# Patient Record
Sex: FEMALE | Race: BLACK OR AFRICAN AMERICAN | Employment: FULL TIME | URBAN - METROPOLITAN AREA
[De-identification: names, ages, dates, MRNs, and addresses within clinical notes are randomized per-mention and may not be internally consistent; named-entity substitution may affect disease eponyms.]

---

## 2017-01-13 ENCOUNTER — OFFICE VISIT (OUTPATIENT)
Dept: INTERNAL MEDICINE CLINIC | Age: 47
End: 2017-01-13

## 2017-01-13 VITALS
SYSTOLIC BLOOD PRESSURE: 133 MMHG | HEART RATE: 102 BPM | OXYGEN SATURATION: 98 % | RESPIRATION RATE: 16 BRPM | DIASTOLIC BLOOD PRESSURE: 85 MMHG | WEIGHT: 268 LBS | BODY MASS INDEX: 43.07 KG/M2 | TEMPERATURE: 98 F | HEIGHT: 66 IN

## 2017-01-13 DIAGNOSIS — B00.1 COLD SORE: ICD-10-CM

## 2017-01-13 RX ORDER — VALACYCLOVIR HYDROCHLORIDE 1 G/1
TABLET, FILM COATED ORAL
Qty: 4 TAB | Refills: 11 | Status: SHIPPED | OUTPATIENT
Start: 2017-01-13 | End: 2017-01-13 | Stop reason: SDUPTHER

## 2017-01-13 RX ORDER — VALACYCLOVIR HYDROCHLORIDE 1 G/1
TABLET, FILM COATED ORAL
Qty: 4 TAB | Refills: 11 | Status: SHIPPED | OUTPATIENT
Start: 2017-01-13

## 2017-01-13 NOTE — MR AVS SNAPSHOT
Visit Information Date & Time Provider Department Dept. Phone Encounter #  
 1/13/2017  1:30 PM Miley Falcon, [address]  [id number] Upcoming Health Maintenance Date Due INFLUENZA AGE 9 TO ADULT 8/1/2016 PAP AKA CERVICAL CYTOLOGY 6/30/2017 DTaP/Tdap/Td series (2 - Td) 6/30/2024 Allergies as of 1/13/2017  Review Complete On: 1/13/2017 By: Jose Cruz No Known Allergies Current Immunizations  Reviewed on 2/5/2015 Name Date Influenza High Dose Vaccine PF 11/1/2014 PPD 1/18/2010 Tdap 6/30/2014 Not reviewed this visit You Were Diagnosed With   
  
 Codes Comments Cold sore     ICD-10-CM: B00.1 ICD-9-CM: 054.9 Vitals BP Pulse Temp Resp Height(growth percentile) Weight(growth percentile) 133/85 (BP 1 Location: Left arm, BP Patient Position: Sitting) (!) 102 98 °F (36.7 °C) (Oral) 16 5' 6\" (1.676 m) 268 lb (121.6 kg) LMP SpO2 BMI OB Status Smoking Status 12/26/2016 (Exact Date) 98% 43.26 kg/m2 Having regular periods Never Smoker Vitals History BMI and BSA Data Body Mass Index Body Surface Area  
 43.26 kg/m 2 2.38 m 2 Preferred Pharmacy Pharmacy Name Phone [facility] [address] [facility] [address] Your Updated Medication List  
  
   
This list is accurate as of: 1/13/17  2:38 PM.  Always use your most recent med list.  
  
  
  
  
 aspirin 81 mg tablet Take 81 mg by mouth.  
  
 biotin 2,500 mcg Tab Take  by mouth daily. citalopram 10 mg tablet Commonly known as:  Dawna Narrow Take 1 tablet by mouth daily. FISH OIL 1,000 mg Cap Generic drug:  omega-3 fatty acids-vitamin e Take 1 Cap by mouth daily. naproxen 500 mg tablet Commonly known as:  NAPROSYN Take 1 Tab by mouth two (2) times daily (with meals). ONE DAILY GUMMY VITES PO Take  by mouth. traMADol 50 mg tablet Commonly known as:  ULTRAM  
Take 1 tablet by mouth every six (6) hours as needed for Pain. TYLENOL PM PO Take  by mouth. valACYclovir 1 gram tablet Commonly known as:  VALTREX Take 2 pills q12h x 2 doses Prescriptions Sent to Pharmacy Refills  
 valACYclovir (VALTREX) 1 gram tablet 11 Sig: Take 2 pills q12h x 2 doses Class: Normal  
 Pharmacy: [id number], [facility] [address] Ph #: [phone] Introducing [facility]! Sanjuanaalba Lopez introduces [employer] patient portal. Now you can access parts of your medical record, email your doctor's office, and request medication refills online. 1. In your internet browser, go to [email or web address] 2. Click on the First Time User? Click Here link in the Sign In box. You will see the New Member Sign Up page. 3. Enter your [employer] Access Code exactly as it appears below. You will not need to use this code after youve completed the sign-up process. If you do not sign up before the expiration date, you must request a new code. · [employer] Access Code: [email or web address] Expires: 4/13/2017  2:09 PM 
 
4. Enter the last four digits of your Social Security Number (xxxx) and Date of Birth (mm/dd/yyyy) as indicated and click Submit. You will be taken to the next sign-up page. 5. Create a [employer] ID. This will be your [employer] login ID and cannot be changed, so think of one that is secure and easy to remember. 6. Create a [employer] password. You can change your password at any time. 7. Enter your Password Reset Question and Answer. This can be used at a later time if you forget your password. 8. Enter your e-mail address. You will receive e-mail notification when new information is available in [address]. 9. Click Sign Up. You can now view and download portions of your medical record. 10. Click the Download Summary menu link to download a portable copy of your medical information. If you have questions, please visit the Frequently Asked Questions section of the [employer] website. Remember, [employer] is NOT to be used for urgent needs. For medical emergencies, dial 911. Now available from your iPhone and Android! Please provide this summary of care documentation to your next provider. Your primary care clinician is listed as Deisy Tavera. If you have any questions after today's visit, please call [phone].

## 2017-01-13 NOTE — PROGRESS NOTES
Petey Lam is a 40 y.o. female and presents with Mouth Lesions  . New pt  Pt has adopted a vegan diet for 2017. Doing well with it. 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 She's been under a lot of stress. Review of Systems  Constitutional: negative for fevers, chills, anorexia and weight loss  Eyes:   negative for visual disturbance and irritation  ENT:   negative for tinnitus,sore throat,nasal congestion,ear pains. hoarseness  Respiratory:  negative for cough, hemoptysis, dyspnea,wheezing  CV:   negative for chest pain, palpitations, lower extremity edema  GI:   negative for nausea, vomiting, diarrhea, abdominal pain,melena  Endo:               negative for polyuria,polydipsia,polyphagia,heat intolerance  Genitourinary: negative for frequency, dysuria and hematuria  Integument:  negative for rash and pruritus  Hematologic:  negative for easy bruising and gum/nose bleeding  Musculoskel: negative for myalgias, arthralgias, back pain, muscle weakness, joint pain  Neurological:  negative for headaches, dizziness, vertigo, memory problems and gait   Behavl/Psych: negative for feelings of anxiety, depression, mood changes    History reviewed. No pertinent past medical history.   Past Surgical History   Procedure Laterality Date    Hx  section       Social History     Social History    Marital status:      Spouse name:     Number of children: 1    Years of education: N/A     Occupational History    nurse--Cardiology      Social History Main Topics    Smoking status: Never Smoker    Smokeless tobacco: Never Used    Alcohol use No      Comment: occasionally    Drug use: No    Sexual activity: Yes     Partners: Male     Birth control/ protection: None      Comment:  of 19 yrs     Other Topics Concern    Exercise Not Asked     none     Social History Narrative    Full time bachelors degree in nursing degree        Son 25 and daughter        Work as Float nurse     Current Outpatient Prescriptions   Medication Sig Dispense Refill    valACYclovir (VALTREX) 1 gram tablet Take 2 pills q12h x 2 doses 4 Tab 11    naproxen (NAPROSYN) 500 mg tablet Take 1 Tab by mouth two (2) times daily (with meals). 30 Tab 0    omega-3 fatty acids-vitamin e (FISH OIL) 1,000 mg cap Take 1 Cap by mouth daily.  biotin 2,500 mcg tab Take  by mouth daily.  FOLIC ACID/MULTIVIT-MINERALS (ONE DAILY GUMMY VITES PO) Take  by mouth.  traMADol (ULTRAM) 50 mg tablet Take 1 tablet by mouth every six (6) hours as needed for Pain. 30 tablet 1    citalopram (CELEXA) 10 mg tablet Take 1 tablet by mouth daily. 30 tablet 1    ACETAMINOPHEN/DIPHENHYDRAMINE (TYLENOL PM PO) Take  by mouth.  aspirin 81 mg tablet Take 81 mg by mouth. No Known Allergies    Objective:  Visit Vitals    /85 (BP 1 Location: Left arm, BP Patient Position: Sitting)    Pulse (!) 102    Temp 98 °F (36.7 °C) (Oral)    Resp 16    Ht 5' 6\" (1.676 m)    Wt 268 lb (121.6 kg)    LMP 12/26/2016 (Exact Date)    SpO2 98%    BMI 43.26 kg/m2     Physical Exam:   General appearance - alert, well appearing, and in no distress  Mental status - alert, oriented to person, place, and time  HEENT- NCAT, PERRLA, EOMI, TM/OP clear, on upper lip is vesicular lesion  Chest - clear to auscultation, no wheezes, rales or rhonchi, symmetric air entry   Heart - normal rate, regular rhythm, normal S1, S2, no murmurs, rubs, clicks or gallops   Abdomen - soft, nontender, nondistended, no masses or organomegaly  Lymph- no adenopathy palpable  Ext-peripheral pulses normal, no pedal edema, no clubbing or cyanosis  Skin-Warm and dry. no hyperpigmentation, vitiligo, or suspicious lesions  Neuro -alert, oriented, normal speech, no focal findings or movement disorder noted      Assessment/Plan:    ICD-10-CM ICD-9-CM    1.  Cold sore B00.1 054.9 valACYclovir (VALTREX) 1 gram tablet      DISCONTINUED: valACYclovir (VALTREX) 1 gram tablet     Orders Placed This Encounter    DISCONTD: valACYclovir (VALTREX) 1 gram tablet     Sig: Take 2 pills q12h x 2 doses     Dispense:  4 Tab     Refill:  11    valACYclovir (VALTREX) 1 gram tablet     Sig: Take 2 pills q12h x 2 doses     Dispense:  4 Tab     Refill:  11     Cold sore- Valtrex refilled    Follow-up Disposition: Not on File

## 2017-06-14 ENCOUNTER — OFFICE VISIT (OUTPATIENT)
Dept: INTERNAL MEDICINE CLINIC | Age: 47
End: 2017-06-14

## 2017-06-14 VITALS
SYSTOLIC BLOOD PRESSURE: 134 MMHG | WEIGHT: 260 LBS | BODY MASS INDEX: 41.78 KG/M2 | OXYGEN SATURATION: 98 % | TEMPERATURE: 98.8 F | DIASTOLIC BLOOD PRESSURE: 78 MMHG | RESPIRATION RATE: 16 BRPM | HEIGHT: 66 IN | HEART RATE: 86 BPM

## 2017-06-14 DIAGNOSIS — J04.0 ACUTE LARYNGITIS: Primary | ICD-10-CM

## 2017-06-14 DIAGNOSIS — J20.9 ACUTE BRONCHITIS, UNSPECIFIED ORGANISM: ICD-10-CM

## 2017-06-14 RX ORDER — CETIRIZINE HCL 10 MG
10 TABLET ORAL DAILY
Qty: 30 TAB | Refills: 0 | Status: SHIPPED | OUTPATIENT
Start: 2017-06-14

## 2017-06-14 RX ORDER — AZITHROMYCIN 250 MG/1
TABLET, FILM COATED ORAL
Qty: 6 TAB | Refills: 0 | Status: SHIPPED | OUTPATIENT
Start: 2017-06-14 | End: 2017-07-20

## 2017-06-14 RX ORDER — IPRATROPIUM BROMIDE 42 UG/1
2 SPRAY, METERED NASAL 4 TIMES DAILY
Qty: 15 ML | Refills: 0 | Status: SHIPPED | OUTPATIENT
Start: 2017-06-14 | End: 2018-03-21 | Stop reason: ALTCHOICE

## 2017-06-14 RX ORDER — BENZONATATE 200 MG/1
200 CAPSULE ORAL
Qty: 21 CAP | Refills: 0 | Status: SHIPPED | OUTPATIENT
Start: 2017-06-14 | End: 2017-06-21

## 2017-06-14 NOTE — PATIENT INSTRUCTIONS
May use  Benadryl, Phenylephrine, or Chlor-Trimetron for continued cough and sore throat. Claritin, allegra, or Zyrtec may also help. Also try Breathe-Rite (or similar) nose strips for nasal congestion and difficulty sleeping. Try 2 teaspoons of 100% pure honey at bedtime to help with nighttime coughing/sore throat. Vitamin C (4080-4944 mg) may also be helpful for your symptoms. *Frequent handwashing*, rest, and plenty of fluids are most important. Laryngitis: Care Instructions  Your Care Instructions    Laryngitis is an inflammation of the voice box (larynx) that causes your voice to become raspy or hoarse. It can be short-lived or long-lasting. Most of the time, laryngitis comes on quickly and lasts as long as 2 weeks. It is caused by overuse, irritation, or infection of the vocal cords inside the larynx. Some of the most common causes are a cold, the flu, or allergies. Loud talking, shouting, cheering, or singing also can cause laryngitis. Stomach acid that backs up into the throat also can make you lose your voice. Resting your voice and taking other steps at home can help you get your voice back. Follow-up care is a key part of your treatment and safety. Be sure to make and go to all appointments, and call your doctor if you are having problems. It's also a good idea to know your test results and keep a list of the medicines you take. How can you care for yourself at home? · Follow your doctor's directions for treating the condition that caused you to lose your voice. If your doctor prescribed antibiotics, take them as directed. Do not stop taking them just because you feel better. You need to take the full course of antibiotics. · Before you use cough and cold medicines, check the label. They may not be safe for young children or for people with certain health problems. · Try to keep stomach acid from backing up into your throat. Do not eat just before bedtime.  Reduce the amount of coffee and alcohol you drink, and eat healthy foods. Taking over-the-counter acid reducers can help when these steps are not enough. In some cases, you may need prescription medicine. · Rest your voice. You do not have to stop speaking, but use your voice as little as possible. Speak softly but do not whisper; whispering can bother your larynx more than speaking softly. Avoid talking on the telephone or trying to speak loudly. · Try not to clear your throat. This can cause more irritation of your larynx. Take an over-the-counter cough suppressant (if your doctor recommends it) if you have a dry cough that does not produce mucus. · Do not smoke or allow others to smoke around you. If you need help quitting, talk to your doctor about stop-smoking programs and medicines. These can increase your chances of quitting for good. · Use a humidifier or vaporizer to add moisture to your bedroom. Humidity helps to thin the mucus in the nasal membranes that causes stuffiness or postnasal drip. Follow the directions for cleaning the machine. · Drink plenty of fluids, enough so that your urine is light yellow or clear like water. If you have kidney, heart, or liver disease and have to limit fluids, talk with your doctor before you increase the amount of fluids you drink. · Relieve nasal stuffiness. A saline (saltwater) nasal wash may help. You can buy saline nose drops at a grocery store or drugstore. Or you can make your own by adding 1 teaspoon of salt and 1 teaspoon of baking soda to 2 cups of distilled water. If you make your own, fill a bulb syringe with the solution, insert the tip into your nostril, and squeeze gently. [place] M[place] your nose. When should you call for help? Call your doctor now or seek immediate medical care if:  · You have new or worse trouble breathing. · You have new pain, or your pain gets worse. · You have trouble swallowing.   Watch closely for changes in your health, and be sure to contact your doctor if:  · Your voice does not get better after 2 weeks of care at home. Where can you learn more? Go to http://vivien-jaylene.info/. Enter Z528 in the search box to learn more about \"Laryngitis: Care Instructions. \"  Current as of: July 29, 2016  Content Version: 11.2  © 3799-8923 [employer]. Care instructions adapted under license by [employer] (which disclaims liability or warranty for this information). If you have questions about a medical condition or this instruction, always ask your healthcare professional. [address] any warranty or liability for your use of this information. Bronchitis: Care Instructions  Your Care Instructions    Bronchitis is inflammation of the bronchial tubes, which carry air to the lungs. The tubes swell and produce mucus, or phlegm. The mucus and inflamed bronchial tubes make you cough. You may have trouble breathing. Most cases of bronchitis are caused by viruses like those that cause colds. Antibiotics usually do not help and they may be harmful. Bronchitis usually develops rapidly and lasts about 2 to 3 weeks in otherwise healthy people. Follow-up care is a key part of your treatment and safety. Be sure to make and go to all appointments, and call your doctor if you are having problems. It's also a good idea to know your test results and keep a list of the medicines you take. How can you care for yourself at home? · Take all medicines exactly as prescribed. Call your doctor if you think you are having a problem with your medicine. · Get some extra rest.  · Take an over-the-counter pain medicine, such as acetaminophen (Tylenol), ibuprofen (Advil, Motrin), or naproxen (Aleve) to reduce fever and relieve body aches. Read and follow all instructions on the label. · Do not take two or more pain medicines at the same time unless the doctor told you to.  Many pain medicines have acetaminophen, which is Tylenol. Too much acetaminophen (Tylenol) can be harmful. · Take an over-the-counter cough medicine that contains dextromethorphan to help quiet a dry, hacking cough so that you can sleep. Avoid cough medicines that have more than one active ingredient. Read and follow all instructions on the label. · Breathe moist air from a humidifier, hot shower, or sink filled with hot water. The heat and moisture will thin mucus so you can cough it out. · Do not smoke. Smoking can make bronchitis worse. If you need help quitting, talk to your doctor about stop-smoking programs and medicines. These can increase your chances of quitting for good. When should you call for help? Call 911 anytime you think you may need emergency care. For example, call if:  · You have severe trouble breathing. Call your doctor now or seek immediate medical care if:  · You have new or worse trouble breathing. · You cough up dark brown or bloody mucus (sputum). · You have a new or higher fever. · You have a new rash. Watch closely for changes in your health, and be sure to contact your doctor if:  · You cough more deeply or more often, especially if you notice more mucus or a change in the color of your mucus. · You are not getting better as expected. Where can you learn more? Go to http://vivien-jaylene.info/. Enter H333 in the search box to learn more about \"Bronchitis: Care Instructions. \"  Current as of: May 23, 2016  Content Version: 11.2  © 3669-4042 [email or web address]. Care instructions adapted under license by [employer] (which disclaims liability or warranty for this information). If you have questions about a medical condition or this instruction, always ask your healthcare professional. [place] [address] any warranty or liability for your use of this information.

## 2017-06-14 NOTE — MR AVS SNAPSHOT
Visit Information Date & Time Provider Department Dept. Phone Encounter #  
 6/14/2017  1:40 PM Juvencio Brady NP [address] [facility] [phone] [id number] Follow-up Instructions Return in about 2 months (around 8/14/2017) for ANNUAL with LABS. Upcoming Health Maintenance Date Due  
 PAP AKA CERVICAL CYTOLOGY 6/30/2017 INFLUENZA AGE 9 TO ADULT 8/1/2017 DTaP/Tdap/Td series (2 - Td) 6/30/2024 Allergies as of 6/14/2017  Review Complete On: 6/14/2017 By: Juvencio Brady NP No Known Allergies Current Immunizations  Reviewed on 2/5/2015 Name Date Influenza High Dose Vaccine PF 11/1/2014 PPD 1/18/2010 Tdap 6/30/2014 Not reviewed this visit You Were Diagnosed With   
  
 Codes Comments Acute laryngitis    -  Primary ICD-10-CM: J04.0 ICD-9-CM: 464.00 Acute bronchitis, unspecified organism     ICD-10-CM: J20.9 ICD-9-CM: 466.0 Vitals BP Pulse Temp Resp Height(growth percentile) Weight(growth percentile) 134/78 (BP 1 Location: Right arm, BP Patient Position: Sitting) 86 98.8 °F (37.1 °C) (Oral) 16 5' 6\" (1.676 m) 260 lb (117.9 kg) SpO2 BMI OB Status Smoking Status 98% 41.97 kg/m2 Having regular periods Never Smoker Vitals History BMI and BSA Data Body Mass Index Body Surface Area 41.97 kg/m 2 2.34 m 2 Preferred Pharmacy Pharmacy Name Phone [facility] [address] [place] [address] Your Updated Medication List  
  
   
This list is accurate as of: 6/14/17  1:53 PM.  Always use your most recent med list.  
  
  
  
  
 aspirin 81 mg tablet Take 81 mg by mouth. azithromycin 250 mg tablet Commonly known as:  Kelsey Singh Take 2 tabs today, then one daily  
  
 benzonatate 200 mg capsule Commonly known as:  TESSALON Take 1 Cap by mouth three (3) times daily as needed for Cough for up to 7 days. biotin 2,500 mcg Tab Take  by mouth daily. cetirizine 10 mg tablet Commonly known as:  ZYRTEC Take 1 Tab by mouth daily. Indications: ALLERGIC RHINITIS FISH OIL 1,000 mg Cap Generic drug:  omega-3 fatty acids-vitamin e Take 1 Cap by mouth daily. ipratropium 0.06 % nasal spray Commonly known as:  ATROVENT  
2 Sprays by Both Nostrils route four (4) times daily. ONE DAILY GUMMY VITES PO Take  by mouth. traMADol 50 mg tablet Commonly known as:  ULTRAM  
Take 1 tablet by mouth every six (6) hours as needed for Pain. TYLENOL PM PO Take  by mouth. valACYclovir 1 gram tablet Commonly known as:  VALTREX Take 2 pills q12h x 2 doses Prescriptions Sent to Pharmacy Refills  
 azithromycin (ZITHROMAX Z-MATTHEW) 250 mg tablet 0 Sig: Take 2 tabs today, then one daily Class: Normal  
 Pharmacy: [place] [address] [facility]  Ph #: [phone]  
 ipratropium (ATROVENT) 0.06 % nasal spray 0 Si Sprays by Both Nostrils route four (4) times daily. Class: Normal  
 Pharmacy: [place] [address] [facility]  Ph #: [phone] Route: Both Nostrils  
 benzonatate (TESSALON) 200 mg capsule 0 Sig: Take 1 Cap by mouth three (3) times daily as needed for Cough for up to 7 days. Class: Normal  
 Pharmacy: [place] [address] [facility] Ph #: [phone] Route: Oral  
 cetirizine (ZYRTEC) 10 mg tablet 0 Sig: Take 1 Tab by mouth daily. Indications: ALLERGIC RHINITIS Class: Normal  
 Pharmacy: [place] [address] [facility]  Ph #: [phone] Route: Oral  
  
Follow-up Instructions Return in about 2 months (around 2017) for ANNUAL with LABS. Patient Instructions May use  Benadryl, Phenylephrine, or Chlor-Trimetron for continued cough and sore throat. Claritin, allegra, or Zyrtec may also help.  Also try Breathe-Rite (or similar) nose strips for nasal congestion and difficulty sleeping. Try 2 teaspoons of 100% pure honey at bedtime to help with nighttime coughing/sore throat. Vitamin C (8182-6204 mg) may also be helpful for your symptoms. *Frequent handwashing*, rest, and plenty of fluids are most important. Laryngitis: Care Instructions Your Care Instructions Laryngitis is an inflammation of the voice box (larynx) that causes your voice to become raspy or hoarse. It can be short-lived or long-lasting. Most of the time, laryngitis comes on quickly and lasts as long as 2 weeks. It is caused by overuse, irritation, or infection of the vocal cords inside the larynx. Some of the most common causes are a cold, the flu, or allergies. Loud talking, shouting, cheering, or singing also can cause laryngitis. Stomach acid that backs up into the throat also can make you lose your voice. Resting your voice and taking other steps at home can help you get your voice back. Follow-up care is a key part of your treatment and safety. Be sure to make and go to all appointments, and call your doctor if you are having problems. It's also a good idea to know your test results and keep a list of the medicines you take. How can you care for yourself at home? · Follow your doctor's directions for treating the condition that caused you to lose your voice. If your doctor prescribed antibiotics, take them as directed. Do not stop taking them just because you feel better. You need to take the full course of antibiotics. · Before you use cough and cold medicines, check the label. They may not be safe for young children or for people with certain health problems. · Try to keep stomach acid from backing up into your throat. Do not eat just before bedtime. Reduce the amount of coffee and alcohol you drink, and eat healthy foods.  Taking over-the-counter acid reducers can help when these steps are not enough. In some cases, you may need prescription medicine. · Rest your voice. You do not have to stop speaking, but use your voice as little as possible. Speak softly but do not whisper; whispering can bother your larynx more than speaking softly. Avoid talking on the telephone or trying to speak loudly. · Try not to clear your throat. This can cause more irritation of your larynx. Take an over-the-counter cough suppressant (if your doctor recommends it) if you have a dry cough that does not produce mucus. · Do not smoke or allow others to smoke around you. If you need help quitting, talk to your doctor about stop-smoking programs and medicines. These can increase your chances of quitting for good. · Use a humidifier or vaporizer to add moisture to your bedroom. Humidity helps to thin the mucus in the nasal membranes that causes stuffiness or postnasal drip. Follow the directions for cleaning the machine. · Drink plenty of fluids, enough so that your urine is light yellow or clear like water. If you have kidney, heart, or liver disease and have to limit fluids, talk with your doctor before you increase the amount of fluids you drink. · Relieve nasal stuffiness. A saline (saltwater) nasal wash may help. You can buy saline nose drops at a grocery store or drugstore. Or you can make your own by adding 1 teaspoon of salt and 1 teaspoon of baking soda to 2 cups of distilled water. If you make your own, fill a bulb syringe with the solution, insert the tip into your nostril, and squeeze gently. [address] your nose. When should you call for help? Call your doctor now or seek immediate medical care if: 
· You have new or worse trouble breathing. · You have new pain, or your pain gets worse. · You have trouble swallowing. Watch closely for changes in your health, and be sure to contact your doctor if: 
· Your voice does not get better after 2 weeks of care at home. Where can you learn more? Go to http://vivien-jaylene.info/. Enter H436 in the search box to learn more about \"Laryngitis: Care Instructions. \" Current as of: July 29, 2016 Content Version: 11.2 © 9165-0801 [employer]. Care instructions adapted under license by [employer] (which disclaims liability or warranty for this information). If you have questions about a medical condition or this instruction, always ask your healthcare professional. [address] any warranty or liability for your use of this information. Bronchitis: Care Instructions Your Care Instructions Bronchitis is inflammation of the bronchial tubes, which carry air to the lungs. The tubes swell and produce mucus, or phlegm. The mucus and inflamed bronchial tubes make you cough. You may have trouble breathing. Most cases of bronchitis are caused by viruses like those that cause colds. Antibiotics usually do not help and they may be harmful. Bronchitis usually develops rapidly and lasts about 2 to 3 weeks in otherwise healthy people. Follow-up care is a key part of your treatment and safety. Be sure to make and go to all appointments, and call your doctor if you are having problems. It's also a good idea to know your test results and keep a list of the medicines you take. How can you care for yourself at home? · Take all medicines exactly as prescribed. Call your doctor if you think you are having a problem with your medicine. · Get some extra rest. 
· Take an over-the-counter pain medicine, such as acetaminophen (Tylenol), ibuprofen (Advil, Motrin), or naproxen (Aleve) to reduce fever and relieve body aches. Read and follow all instructions on the label. · Do not take two or more pain medicines at the same time unless the doctor told you to. Many pain medicines have acetaminophen, which is Tylenol. Too much acetaminophen (Tylenol) can be harmful. · Take an over-the-counter cough medicine that contains dextromethorphan to help quiet a dry, hacking cough so that you can sleep. Avoid cough medicines that have more than one active ingredient. Read and follow all instructions on the label. · Breathe moist air from a humidifier, hot shower, or sink filled with hot water. The heat and moisture will thin mucus so you can cough it out. · Do not smoke. Smoking can make bronchitis worse. If you need help quitting, talk to your doctor about stop-smoking programs and medicines. These can increase your chances of quitting for good. When should you call for help? Call 911 anytime you think you may need emergency care. For example, call if: 
· You have severe trouble breathing. Call your doctor now or seek immediate medical care if: 
· You have new or worse trouble breathing. · You cough up dark brown or bloody mucus (sputum). · You have a new or higher fever. · You have a new rash. Watch closely for changes in your health, and be sure to contact your doctor if: 
· You cough more deeply or more often, especially if you notice more mucus or a change in the color of your mucus. · You are not getting better as expected. Where can you learn more? Go to http://vivien-jaylene.info/. Enter H333 in the search box to learn more about \"Bronchitis: Care Instructions. \" Current as of: May 23, 2016 Content Version: 11.2 © 1087-5300 [employer]. Care instructions adapted under license by [employer] (which disclaims liability or warranty for this information). If you have questions about a medical condition or this instruction, always ask your healthcare professional. [address] any warranty or liability for your use of this information. Introducing [facility]!    
 Josseline Willson introduces 8digits patient portal. Now you can access parts of your medical record, email your doctor's office, and request medication refills online. 1. In your internet browser, go to https://[employer] Adbrain/[employer] 2. Click on the First Time User? Click Here link in the Sign In box. You will see the New Member Sign Up page. 3. Enter your [employer] Access Code exactly as it appears below. You will not need to use this code after youve completed the sign-up process. If you do not sign up before the expiration date, you must request a new code. · [employer] Access Code: [email or web address] Expires: 9/12/2017  1:01 PM 
 
4. Enter the last four digits of your Social Security Number (xxxx) and Date of Birth (mm/dd/yyyy) as indicated and click Submit. You will be taken to the next sign-up page. 5. Create a [employer] ID. This will be your [employer] login ID and cannot be changed, so think of one that is secure and easy to remember. 6. Create a [employer] password. You can change your password at any time. 7. Enter your Password Reset Question and Answer. This can be used at a later time if you forget your password. 8. Enter your e-mail address. You will receive e-mail notification when new information is available in [address] 9. Click Sign Up. You can now view and download portions of your medical record. 10. Click the Download Summary menu link to download a portable copy of your medical information. If you have questions, please visit the Frequently Asked Questions section of the [employer] website. Remember, [employer] is NOT to be used for urgent needs. For medical emergencies, dial 911. Now available from your iPhone and Android! Please provide this summary of care documentation to your next provider. Your primary care clinician is listed as HASEEB Lobo. If you have any questions after today's visit, please call [phone].

## 2017-06-14 NOTE — LETTER
NOTIFICATION RETURN TO WORK / SCHOOL 
 
6/14/2017 1:50 PM 
 
Ms. Jenni Underwood [id number] [id number] To Whom It May Concern: 
 
Jenni Underwood is currently under the care of [id number] [place] [address]. She will return to work/school on: 6/16/17 If there are questions or concerns please have the patient contact our office. Sincerely, Allie Yates NP

## 2017-06-14 NOTE — PROGRESS NOTES
Corrin Dakin is a 40 y.o. female    Chief Complaint   Patient presents with    Sore Throat     Headache, Green mucus X 4days     1. Have you been to the ER, urgent care clinic since your last visit? Hospitalized since your last visit? No    2. Have you seen or consulted any other health care providers outside of the [address] [place] [address] since your last visit? Include any pap smears or colon screening.  No

## 2017-06-14 NOTE — PROGRESS NOTES
Jil Mondragon is a 40 y.o. female and presents with Sore Throat (Headache, Green mucus X 4days)    Subjective:  Upper respiratory infection Review:  Jil Mondragon is a 40 y.o. female who complains of nasal congestion,sore throat, productive cough, myalgias and headache for the past few days, gradually worsening since that time. She denies a history of shortness of breath. Evaluation to date: none. Treatment to date: decongestants, antihistamines, cough suppressants, OTC products. Patient does not smoke cigarettes. Relevant PMH: No pertinent additional PMH. Review of Systems  Constitutional: negative for fevers, chills, anorexia and weight loss  Eyes:   negative for visual disturbance, drainage, and irritation  ENT:   negative for tinnitus,sore throat,ear pain  Respiratory:  negative for hemoptysis, dyspnea, and wheezing  CV:   negative for chest pain, palpitations, and lower extremity edema  GI:   negative for nausea, vomiting, diarrhea, abdominal pain, and melena  Endo:               negative for polyuria,polydipsia,polyphagia, and heat intolerance  Genitourinary: negative for frequency, urgency, dysuria, retention, and hematuria  Integument:  negative for rash, ulcerations, and pruritus  Hematologic:  negative for easy bruising and bleeding  Musculoskel: negative for arthralgias, muscle weakness,and joint pain/swelling  Neurological:  negative for headaches, dizziness, vertigo,and memory/gait problems  Behavl/Psych: negative for feelings of anxiety, depression, suicide, and mood changes    History reviewed. No pertinent past medical history.   Past Surgical History:   Procedure Laterality Date    HX  SECTION       Social History     Social History    Marital status:      Spouse name:     Number of children: 1    Years of education: N/A     Occupational History    nurse--Cardiology      Social History Main Topics    Smoking status: Never Smoker    Smokeless tobacco: Never Used  Alcohol use No      Comment: occasionally    Drug use: No    Sexual activity: Yes     Partners: Male     Birth control/ protection: None      Comment:  of 19 yrs     Other Topics Concern    Exercise Not Asked     none     Social History Narrative    Full time bachelors degree in nursing degree        Son 25 and daughter        Work as Float nurse     Family History   Problem Relation Age of Onset    Diabetes Mother     Cancer Mother      thyroid    Cancer Maternal Aunt      thyroid, lung cancer    Asthma Brother     Diabetes Brother     Arrhythmia Father     Heart defect Father     Heart Surgery Father      AICD -- sudden death     Current Outpatient Prescriptions   Medication Sig Dispense Refill    azithromycin (ZITHROMAX Z-MATTHEW) 250 mg tablet Take 2 tabs today, then one daily 6 Tab 0    ipratropium (ATROVENT) 0.06 % nasal spray 2 Sprays by Both Nostrils route four (4) times daily. 15 mL 0    benzonatate (TESSALON) 200 mg capsule Take 1 Cap by mouth three (3) times daily as needed for Cough for up to 7 days. 21 Cap 0    cetirizine (ZYRTEC) 10 mg tablet Take 1 Tab by mouth daily. Indications: ALLERGIC RHINITIS 30 Tab 0    valACYclovir (VALTREX) 1 gram tablet Take 2 pills q12h x 2 doses 4 Tab 11    omega-3 fatty acids-vitamin e (FISH OIL) 1,000 mg cap Take 1 Cap by mouth daily.  biotin 2,500 mcg tab Take  by mouth daily.  FOLIC ACID/MULTIVIT-MINERALS (ONE DAILY GUMMY VITES PO) Take  by mouth.  aspirin 81 mg tablet Take 81 mg by mouth.  traMADol (ULTRAM) 50 mg tablet Take 1 tablet by mouth every six (6) hours as needed for Pain. 30 tablet 1    ACETAMINOPHEN/DIPHENHYDRAMINE (TYLENOL PM PO) Take  by mouth.        No Known Allergies    Objective:  Visit Vitals    /78 (BP 1 Location: Right arm, BP Patient Position: Sitting)    Pulse 86    Temp 98.8 °F (37.1 °C) (Oral)    Resp 16    Ht 5' 6\" (1.676 m)    Wt 260 lb (117.9 kg)    SpO2 98%    BMI 41.97 kg/m2     Wt Readings from Last 3 Encounters:   17 260 lb (117.9 kg)   17 268 lb (121.6 kg)   09/09/15 270 lb (122.5 kg)     Physical Exam:   General appearance - alert, well appearing, and in mild distress. +malaise and hoarseness. Mental status - A/O x 4, normal mood and affect. Head/Eyes- AT/NC. LEYDI, EOMI, corneas normal, no foreign bodies. Nose- Turbinates pale. Septum midline, no polyps. Pink mucosa. No sinus tenderness. Ears- TM injected bilaterally. No erythema or drainage. Mouth/Throat - mucous membranes moist, pharynx normal. + PND. No tonsillar edema or exudates. Neck -Supple ,normal CSP. FROM, non-tender. No adenopathy/thyromegaly. Chest - clear to auscultation with symmetric chest rise. No wheezing, rales or rhonchi. Heart - Normal rate, regular rhythm. Normal S1, S2. No MGR. Skin-Warm and dry. No hyperpigmentation, ulcerations, or suspicious lesions    Assessment/Plan:  Medication Side Effects and Warnings were discussed with patient: yes   Patient Labs were reviewed: yes  Patient Past Records were reviewed: yes    See below for other orders   Follow-up Disposition:  Return in about 2 months (around 2017) for ANNUAL with LABS. ICD-10-CM ICD-9-CM    1. Acute laryngitis J04.0 464.00 azithromycin (ZITHROMAX Z-MATTHEW) 250 mg tablet      ipratropium (ATROVENT) 0.06 % nasal spray      benzonatate (TESSALON) 200 mg capsule      cetirizine (ZYRTEC) 10 mg tablet   2. Acute bronchitis, unspecified organism J20.9 466.0 azithromycin (ZITHROMAX Z-MATTHEW) 250 mg tablet      ipratropium (ATROVENT) 0.06 % nasal spray      benzonatate (TESSALON) 200 mg capsule      cetirizine (ZYRTEC) 10 mg tablet     Orders Placed This Encounter    azithromycin (ZITHROMAX Z-MATTHEW) 250 mg tablet     Sig: Take 2 tabs today, then one daily     Dispense:  6 Tab     Refill:  0    ipratropium (ATROVENT) 0.06 % nasal spray     Si Sprays by Both Nostrils route four (4) times daily.      Dispense:  15 mL     Refill:  0    benzonatate (TESSALON) 200 mg capsule     Sig: Take 1 Cap by mouth three (3) times daily as needed for Cough for up to 7 days. Dispense:  21 Cap     Refill:  0    cetirizine (ZYRTEC) 10 mg tablet     Sig: Take 1 Tab by mouth daily. Indications: ALLERGIC RHINITIS     Dispense:  30 Tab     Refill:  0       Christine Davis expressed understanding of plan. An After Visit Summary was offered/printed and given to the patient.
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
## 2017-07-19 DIAGNOSIS — M79.605 LEG PAIN, LEFT: ICD-10-CM

## 2017-07-19 RX ORDER — TRAMADOL HYDROCHLORIDE 50 MG/1
50 TABLET ORAL
Qty: 4 TAB | Refills: 0 | OUTPATIENT
Start: 2017-07-19 | End: 2017-07-20 | Stop reason: SDUPTHER

## 2017-07-20 ENCOUNTER — OFFICE VISIT (OUTPATIENT)
Dept: INTERNAL MEDICINE CLINIC | Age: 47
End: 2017-07-20

## 2017-07-20 VITALS
OXYGEN SATURATION: 99 % | HEART RATE: 89 BPM | SYSTOLIC BLOOD PRESSURE: 130 MMHG | RESPIRATION RATE: 18 BRPM | HEIGHT: 66 IN | DIASTOLIC BLOOD PRESSURE: 72 MMHG | BODY MASS INDEX: 42.43 KG/M2 | WEIGHT: 264 LBS | TEMPERATURE: 98.4 F

## 2017-07-20 DIAGNOSIS — M62.838 MUSCLE SPASM: ICD-10-CM
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
## 2017-07-20 DIAGNOSIS — M54.5 ACUTE LOW BACK PAIN, UNSPECIFIED BACK PAIN LATERALITY, WITH SCIATICA PRESENCE UNSPECIFIED: ICD-10-CM

## 2017-07-20 RX ORDER — METHOCARBAMOL 500 MG/1
500 TABLET, FILM COATED ORAL
Qty: 20 TAB | Refills: 1 | Status: SHIPPED | OUTPATIENT
Start: 2017-07-20 | End: 2018-02-15 | Stop reason: ALTCHOICE

## 2017-07-20 RX ORDER — TRAMADOL HYDROCHLORIDE 50 MG/1
50 TABLET ORAL
Qty: 20 TAB | Refills: 1 | Status: SHIPPED | OUTPATIENT
Start: 2017-07-20 | End: 2018-03-21 | Stop reason: ALTCHOICE
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
## 2017-07-20 NOTE — LETTER
NOTIFICATION RETURN TO WORK / SCHOOL 
 
7/20/2017 1:32 PM 
 
Ms. Jelly Sevilla [id number] [facility] [phone] To Whom It May Concern: 
 
Jelly Sevilla is currently under the care of Keli Lisa. She will return to work/school on: 7/22/17, refrain from heavy lifting greater than 20 lbs for next 2 weeks then progress to normal activity thereafter. If there are questions or concerns please have the patient contact our office. Sincerely, Karla Navarro NP
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
## 2017-07-20 NOTE — LETTER
NOTIFICATION RETURN TO WORK / SCHOOL 
 
7/20/2017 1:17 PM 
 
Ms. Analia Clarke  [id number] [facility] [phone] To Whom It May Concern: 
 
Analia Clarke is currently under the care of Keli Lisa. She will return to work/school on: 7/21/17, had an appt to be seen in our office today If there are questions or concerns please have the patient contact our office. Sincerely, Tonya Herman NP
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
## 2017-08-03 ENCOUNTER — HOSPITAL ENCOUNTER (OUTPATIENT)
Dept: PHYSICAL THERAPY | Age: 47
Discharge: HOME | End: 2017-08-03
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
## 2017-08-03 NOTE — PROGRESS NOTES
[facility]  [address]  [place], [address] [facility]  Phone: [phone]  Fax: [phone]    Plan of Care/Statement of Necessity for Physical Therapy Services  2-15    Patient name: Chica Esquivel  : 1970  Provider#: [id number]  Referral source: Harman Constantino NP      Medical/Treatment Diagnosis: Low back pain [M54.5]     Prior Hospitalization: see medical history     Comorbidities: ***  Prior Level of Function: ***  Medications: Verified on Patient Summary List    Start of Care: ***      Onset Date: ***       The Plan of Care and following information is based on the information from the initial evaluation. Assessment/ key information: ***    Evaluation Complexity History {PT OP History :43128}; Examination {PT OP Examination :56690} ; Presentation {PT OP Presentation :62855} ; Clinical Decision Making {PT OP Decision Making :54114}  Overall Complexity Rating: {PT OP Complexity:04997}    Problem List: {BSHSI IP PROBLEM LIST:59351}   Treatment Plan may include any combination of the following: {Outpt PT Treatment PPQD:19785}  Patient / Family readiness to learn indicated by: {Outpt PT Patient Family Readiness to Learn:}  Persons(s) to be included in education: {IM Persons Education:66041}  Barriers to Learning/Limitations: {barriers to learning/limitations:}  Patient Goal (s): ***  Patient Self Reported Health Status: {Outpt PT Rehabilitation Potential:76157}  Rehabilitation Potential: {Outpt PT Rehabilitation Potential:92049}    Short Term Goals: To be accomplished in *** {BSHSI OP WEEKS/TREATMENTS:}:   ***  Long Term Goals: To be accomplished in *** {BSHSI OP WEEKS/TREATMENTS:}:   ***  Frequency / Duration: Patient to be seen *** times per week for *** {BSHSI OP WEEKS/TREATMENTS:}.     Patient/ Caregiver education and instruction: {Outpt PT patient caregiver ed.:62779}    [x]  Plan of care has been reviewed with SONY Martinez PT 8/3/2017 12:58 PM    ________________________________________________________________________    I certify that the above Therapy Services are being furnished while the patient is under my care. I agree with the treatment plan and certify that this therapy is necessary.     [de-identified] Signature:____________________  Date:____________Time: _________

## 2017-08-10 ENCOUNTER — TELEPHONE (OUTPATIENT)
Dept: INTERNAL MEDICINE CLINIC | Age: 47
End: 2017-08-10

## 2017-08-10 ENCOUNTER — OFFICE VISIT (OUTPATIENT)
Dept: INTERNAL MEDICINE CLINIC | Age: 47
End: 2017-08-10

## 2017-08-10 VITALS
TEMPERATURE: 98.8 F | RESPIRATION RATE: 16 BRPM | WEIGHT: 262.9 LBS | HEART RATE: 83 BPM | SYSTOLIC BLOOD PRESSURE: 117 MMHG | OXYGEN SATURATION: 99 % | BODY MASS INDEX: 42.25 KG/M2 | HEIGHT: 66 IN | DIASTOLIC BLOOD PRESSURE: 81 MMHG

## 2017-08-10 DIAGNOSIS — M62.838 MUSCLE SPASM: ICD-10-CM
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
## 2017-08-10 DIAGNOSIS — M54.50 ACUTE MIDLINE LOW BACK PAIN WITHOUT SCIATICA: Primary | ICD-10-CM

## 2017-08-10 RX ORDER — CYCLOBENZAPRINE HCL 10 MG
10 TABLET ORAL
Qty: 20 TAB | Refills: 1 | Status: SHIPPED | OUTPATIENT
Start: 2017-08-10 | End: 2018-02-15 | Stop reason: ALTCHOICE
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
## 2017-08-23 DIAGNOSIS — M54.5 ACUTE LOW BACK PAIN, UNSPECIFIED BACK PAIN LATERALITY, WITH SCIATICA PRESENCE UNSPECIFIED: ICD-10-CM
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
## 2017-08-23 DIAGNOSIS — M62.838 MUSCLE SPASM: ICD-10-CM

## 2017-08-24 RX ORDER — TRAMADOL HYDROCHLORIDE 50 MG/1
50 TABLET ORAL
Qty: 20 TAB | Refills: 1 | OUTPATIENT
Start: 2017-08-24

## 2018-02-15 ENCOUNTER — OFFICE VISIT (OUTPATIENT)
Dept: INTERNAL MEDICINE CLINIC | Age: 48
End: 2018-02-15

## 2018-02-15 VITALS
HEART RATE: 100 BPM | OXYGEN SATURATION: 96 % | DIASTOLIC BLOOD PRESSURE: 86 MMHG | TEMPERATURE: 98.4 F | RESPIRATION RATE: 18 BRPM | HEIGHT: 66 IN | WEIGHT: 268.3 LBS | BODY MASS INDEX: 43.12 KG/M2 | SYSTOLIC BLOOD PRESSURE: 136 MMHG

## 2018-02-15 DIAGNOSIS — Z13.220 SCREENING FOR HYPERLIPIDEMIA: ICD-10-CM

## 2018-02-15 DIAGNOSIS — F43.22 ADJUSTMENT DISORDER WITH ANXIOUS MOOD: ICD-10-CM

## 2018-02-15 DIAGNOSIS — M54.50 ACUTE BILATERAL LOW BACK PAIN WITHOUT SCIATICA: ICD-10-CM

## 2018-02-15 DIAGNOSIS — Z13.1 SCREENING FOR DIABETES MELLITUS: ICD-10-CM

## 2018-02-15 DIAGNOSIS — Z13.29 SCREENING FOR THYROID DISORDER: ICD-10-CM

## 2018-02-15 DIAGNOSIS — Z00.00 WELL WOMAN EXAM (NO GYNECOLOGICAL EXAM): Primary | ICD-10-CM

## 2018-02-15 DIAGNOSIS — M62.838 MUSCLE SPASMS OF BOTH LOWER EXTREMITIES: ICD-10-CM

## 2018-02-15 RX ORDER — METAXALONE 400 MG/1
400 TABLET ORAL 3 TIMES DAILY
Qty: 15 TAB | Refills: 1 | Status: SHIPPED | OUTPATIENT
Start: 2018-02-15 | End: 2018-03-21 | Stop reason: ALTCHOICE

## 2018-02-15 NOTE — PROGRESS NOTES
Chief Complaint   Patient presents with    Spasms     rm 4 top of buttocks and radiates to legs     Anxiety     going on 2 months now      1. Have you been to the ER, urgent care clinic since your last visit? Hospitalized since your last visit? No    2. Have you seen or consulted any other health care providers outside of the [address] [place] [address] since your last visit? Include any pap smears or colon screening.  No
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
## 2018-02-15 NOTE — PATIENT INSTRUCTIONS
Low Back Pain: Exercises  Your Care Instructions  Here are some examples of typical rehabilitation exercises for your condition. Start each exercise slowly. Ease off the exercise if you start to have pain. Your doctor or physical therapist will tell you when you can start these exercises and which ones will work best for you. How to do the exercises  Press-up    1. Lie on your stomach, supporting your body with your forearms. 2. Press your elbows down into the floor to raise your upper back. As you do this, relax your stomach muscles and allow your back to arch without using your back muscles. As your press up, do not let your hips or pelvis come off the floor. 3. Hold for 15 to 30 seconds, then relax. 4. Repeat 2 to 4 times. Alternate arm and leg (bird dog) exercise    Do this exercise slowly. Try to keep your body straight at all times, and do not let one hip drop lower than the other. 1. Start on the floor, on your hands and knees. 2. Tighten your belly muscles. 3. Raise one leg off the floor, and hold it straight out behind you. Be careful not to let your hip drop down, because that will twist your trunk. 4. Hold for about 6 seconds, then lower your leg and switch to the other leg. 5. Repeat 8 to 12 times on each leg. 6. Over time, work up to holding for 10 to 30 seconds each time. 7. If you feel stable and secure with your leg raised, try raising the opposite arm straight out in front of you at the same time. Knee-to-chest exercise    1. Lie on your back with your knees bent and your feet flat on the floor. 2. Bring one knee to your chest, keeping the other foot flat on the floor (or keeping the other leg straight, whichever feels better on your lower back). 3. Keep your lower back pressed to the floor. Hold for at least 15 to 30 seconds. 4. Relax, and lower the knee to the starting position. 5. Repeat with the other leg. Repeat 2 to 4 times with each leg.   6. To get more stretch, put your other leg flat on the floor while pulling your knee to your chest.  Curl-ups    1. Lie on the floor on your back with your knees bent at a 90-degree angle. Your feet should be flat on the floor, about 12 inches from your buttocks. 2. Cross your arms over your chest. If this bothers your neck, try putting your hands behind your neck (not your head), with your elbows spread apart. 3. Slowly tighten your belly muscles and raise your shoulder blades off the floor. 4. Keep your head in line with your body, and do not press your chin to your chest.  5. Hold this position for 1 or 2 seconds, then slowly lower yourself back down to the floor. 6. Repeat 8 to 12 times. Pelvic tilt exercise    1. Lie on your back with your knees bent. 2. \"Brace\" your stomach. This means to tighten your muscles by pulling in and imagining your belly button moving toward your spine. You should feel like your back is pressing to the floor and your hips and pelvis are rocking back. 3. Hold for about 6 seconds while you breathe smoothly. 4. Repeat 8 to 12 times. Heel dig bridging    1. Lie on your back with both knees bent and your ankles bent so that only your heels are digging into the floor. Your knees should be bent about 90 degrees. 2. Then push your heels into the floor, squeeze your buttocks, and lift your hips off the floor until your shoulders, hips, and knees are all in a straight line. 3. Hold for about 6 seconds as you continue to breathe normally, and then slowly lower your hips back down to the floor and rest for up to 10 seconds. 4. Do 8 to 12 repetitions. Hamstring stretch in doorway    1. Lie on your back in a doorway, with one leg through the open door. 2. Slide your leg up the wall to straighten your knee. You should feel a gentle stretch down the back of your leg. 3. Hold the stretch for at least 15 to 30 seconds. Do not arch your back, point your toes, or bend either knee.  Keep one heel touching the floor and the other heel touching the wall. 4. Repeat with your other leg. 5. Do 2 to 4 times for each leg. Hip flexor stretch    1. Kneel on the floor with one knee bent and one leg behind you. Place your forward knee over your foot. Keep your other knee touching the floor. 2. Slowly push your hips forward until you feel a stretch in the upper thigh of your rear leg. 3. Hold the stretch for at least 15 to 30 seconds. Repeat with your other leg. 4. Do 2 to 4 times on each side. Wall sit    1. Stand with your back 10 to 12 inches away from a wall. 2. Lean into the wall until your back is flat against it. 3. Slowly slide down until your knees are slightly bent, pressing your lower back into the wall. 4. Hold for about 6 seconds, then slide back up the wall. 5. Repeat 8 to 12 times. Follow-up care is a key part of your treatment and safety. Be sure to make and go to all appointments, and call your doctor if you are having problems. It's also a good idea to know your test results and keep a list of the medicines you take. Where can you learn more? Go to http://vivienChronon Systemsjaylene.info/. Enter P084 in the search box to learn more about \"Low Back Pain: Exercises. \"  Current as of: March 21, 2017  Content Version: 11.4  © 5910-8721 [employer]. Care instructions adapted under license by [employer] (which disclaims liability or warranty for this information). If you have questions about a medical condition or this instruction, always ask your healthcare professional. [place] [address] any warranty or liability for your use of this information. Piriformis Syndrome: Care Instructions  Your Care Instructions    The piriformis muscle is deep under your rear end (buttock). One end of the muscle connects deep inside the pelvic area, and the other end attaches to the top of the thighbone.  This muscle can press on the sciatic nerve that runs from your spine down your leg. When this happens, you may have pain, numbness, and tingling in the buttock and down the back of your leg. This is called piriformis syndrome. The pain may get worse when you sit for a long time or climb stairs. Also, you may be more likely to develop piriformis syndrome if you run or walk often. Your doctor will check for other causes of your pain before treating this syndrome. Treatment may include stretching exercises, massage, and medicine for the pain and swelling. If these do not help, you may get a shot of steroid medicine. Until the pain is gone, you may need to rest the muscle and limit activities like running. Exercises and a change in how you move and sit may be enough to stop the pressure on the nerve. Follow-up care is a key part of your treatment and safety. Be sure to make and go to all appointments, and call your doctor if you are having problems. It's also a good idea to know your test results and keep a list of the medicines you take. How can you care for yourself at home? · If your doctor thinks that strenuous exercise is causing your problem, stop or cut back on activities such as running. You may find swimming to be a good exercise for a while. · Stretch the piriformis muscle. Jarrod Hoof on your back. ¨ Bend one leg at the knee and keep the other leg flat on the ground. ¨ Raise your bent knee up and then move it across your body. Hold the outside of the knee with the opposite hand. ¨ Gently pull the knee with your hand toward the opposite shoulder. ¨ Hold the stretch for at least 15 to 30 seconds. Switch legs. ¨ Do the stretch several times each day. · Massage the muscle to relieve pressure. ¨ Sit on the floor. Lean to one side so that the hip on your sore side is off the ground. Put a tennis ball under your buttock on that side. ¨ As you put weight onto the tennis ball, you may find spots that are especially sore.  Move gently so that the tennis ball gently massages each of the sore spots. · Use ice or heat to help reduce pain. Put ice or a cold pack or a heating pad set on low or a warm cloth on the sore area for 10 to 20 minutes at a time. Put a thin cloth between the ice pack or heating pad and your skin. · Ask your doctor if you can take an over-the-counter pain medicine, such as acetaminophen (Tylenol), ibuprofen (Advil, Motrin), or naproxen (Aleve). Be safe with medicines. Read and follow all instructions on the label. · Have your doctor or a physical therapist watch how you move. You may need physical therapy or special inserts in your shoes (orthotics) to help you move in a way that does not put pressure on your nerves. When should you call for help? Watch closely for changes in your health, and be sure to contact your doctor if:  ? · You do not feel better after several weeks of home care. ? · Your pain gets worse. ? · Your leg becomes weak or numb. Where can you learn more? Go to http://vivien-jaylene.info/. Enter G081 in the search box to learn more about \"Piriformis Syndrome: Care Instructions. \"  Current as of: March 21, 2017  Content Version: 11.4  © 5654-1869 [employer]. Care instructions adapted under license by [employer] (which disclaims liability or warranty for this information). If you have questions about a medical condition or this instruction, always ask your healthcare professional. [place] [address] any warranty or liability for your use of this information. Piriformis Syndrome: Exercises  Your Care Instructions  Here are some examples of typical rehabilitation exercises for your condition. Start each exercise slowly. Ease off the exercise if you start to have pain. Your doctor or physical therapist will tell you when you can start these exercises and which ones will work best for you. How to do the exercises  Hip rotator stretch    5.  Lie on your back with both knees bent and your feet flat on the floor. 6. Put the ankle of your affected leg on your opposite thigh near your knee. 7. Use your hand to gently push your knee (on your affected leg) away from your body until you feel a gentle stretch around your hip. 8. Hold the stretch for 15 to 30 seconds. 9. Repeat 2 to 4 times. 10. Switch legs and repeat steps 1 through 5. Piriformis stretch    8. Lie on your back with your legs straight. 9. Lift your affected leg and bend your knee. With your opposite hand, reach across your body, and then gently pull your knee toward your opposite shoulder. 10. Hold the stretch for 15 to 30 seconds. 11. Repeat with your other leg. 12. Repeat 2 to 4 times on each side. Lower abdominal strengthening    7. Lie on your back with your knees bent and your feet flat on the floor. 8. Tighten your belly muscles by pulling your belly button in toward your spine. 9. Lift one foot off the floor and bring your knee toward your chest, so that your knee is straight above your hip and your leg is bent like the letter \"L. \"  10. Lift the other knee up to the same position. 11. Lower one leg at a time to the starting position. 12. Keep alternating legs until you have lifted each leg 8 to 12 times. 13. Be sure to keep your belly muscles tight and your back still as you are moving your legs. Be sure to breathe normally. Follow-up care is a key part of your treatment and safety. Be sure to make and go to all appointments, and call your doctor if you are having problems. It's also a good idea to know your test results and keep a list of the medicines you take. Where can you learn more? Go to http://vivien-jaylene.info/. Enter Z522 in the search box to learn more about \"Piriformis Syndrome: Exercises. \"  Current as of: March 21, 2017  Content Version: 11.4  © 3824-3626 [employer].  Care instructions adapted under license by [employer] (which disclaims liability or warranty for this information). If you have questions about a medical condition or this instruction, always ask your healthcare professional. [place] [address] any warranty or liability for your use of this information.

## 2018-02-16 NOTE — TELEPHONE ENCOUNTER
----- Message from [facility] sent at 2/16/2018 10:04 AM EST -----  Regarding: FW: Dr. Bryce Cardona Telephone      ----- Message -----     From: Denver Amadeo     Sent: 2/16/2018   9:41 AM       To: [facility] Front Office  Subject: Dr. Aleena Sam will like to drop of \"forms\" to be filled by the doctor and stated \"she don't want to pay $100 for medication \"musseal relaxer prescribed on 2/15/18\" she is asking the doctor to call in a Rx for a cheaper medication to [address] [place] [address] on [facility]. Best Contact [phone].

## 2018-02-16 NOTE — TELEPHONE ENCOUNTER
I called patient and left a message letting her know she can drop off forms and that I will send Amyabdullahi Pily a message to see if she will call in anything else for muscle spasms that are cheaper. I pended a muscle relaxer that is usually used.

## 2018-02-17 LAB
25(OH)D3+25(OH)D2 SERPL-MCNC: 17.8 NG/ML (ref 30–100)
ALBUMIN SERPL-MCNC: 4.4 G/DL (ref 3.5–5.5)
ALBUMIN/GLOB SERPL: 1.6 {RATIO} (ref 1.2–2.2)
ALP SERPL-CCNC: 63 IU/L (ref 39–117)
ALT SERPL-CCNC: 18 IU/L (ref 0–32)
AST SERPL-CCNC: 21 IU/L (ref 0–40)
BASOPHILS # BLD AUTO: 0.1 X10E3/UL (ref 0–0.2)
BASOPHILS NFR BLD AUTO: 1 %
BILIRUB SERPL-MCNC: 0.3 MG/DL (ref 0–1.2)
BUN SERPL-MCNC: 11 MG/DL (ref 6–24)
BUN/CREAT SERPL: 13 (ref 9–23)
CALCIUM SERPL-MCNC: 9.4 MG/DL (ref 8.7–10.2)
CHLORIDE SERPL-SCNC: 105 MMOL/L (ref 96–106)
CHOLEST SERPL-MCNC: 181 MG/DL (ref 100–199)
CO2 SERPL-SCNC: 23 MMOL/L (ref 18–29)
CREAT SERPL-MCNC: 0.88 MG/DL (ref 0.57–1)
EOSINOPHIL # BLD AUTO: 0.3 X10E3/UL (ref 0–0.4)
EOSINOPHIL NFR BLD AUTO: 4 %
ERYTHROCYTE [DISTWIDTH] IN BLOOD BY AUTOMATED COUNT: 15 % (ref 12.3–15.4)
EST. AVERAGE GLUCOSE BLD GHB EST-MCNC: 108 MG/DL
GFR SERPLBLD CREATININE-BSD FMLA CKD-EPI: 79 ML/MIN/1.73
GFR SERPLBLD CREATININE-BSD FMLA CKD-EPI: 91 ML/MIN/1.73
GLOBULIN SER CALC-MCNC: 2.8 G/DL (ref 1.5–4.5)
GLUCOSE SERPL-MCNC: 84 MG/DL (ref 65–99)
HBA1C MFR BLD: 5.4 % (ref 4.8–5.6)
HCT VFR BLD AUTO: 39.8 % (ref 34–46.6)
HDLC SERPL-MCNC: 54 MG/DL
HGB BLD-MCNC: 13.1 G/DL (ref 11.1–15.9)
IMM GRANULOCYTES # BLD: 0 X10E3/UL (ref 0–0.1)
IMM GRANULOCYTES NFR BLD: 0 %
LDLC SERPL CALC-MCNC: 98 MG/DL (ref 0–99)
LYMPHOCYTES # BLD AUTO: 2.3 X10E3/UL (ref 0.7–3.1)
LYMPHOCYTES NFR BLD AUTO: 28 %
MAGNESIUM SERPL-MCNC: 2.3 MG/DL (ref 1.6–2.3)
MCH RBC QN AUTO: 28.3 PG (ref 26.6–33)
MCHC RBC AUTO-ENTMCNC: 32.9 G/DL (ref 31.5–35.7)
MCV RBC AUTO: 86 FL (ref 79–97)
MONOCYTES # BLD AUTO: 0.8 X10E3/UL (ref 0.1–0.9)
MONOCYTES NFR BLD AUTO: 10 %
NEUTROPHILS # BLD AUTO: 4.6 X10E3/UL (ref 1.4–7)
NEUTROPHILS NFR BLD AUTO: 57 %
PLATELET # BLD AUTO: 564 X10E3/UL (ref 150–379)
POTASSIUM SERPL-SCNC: 4.1 MMOL/L (ref 3.5–5.2)
PROT SERPL-MCNC: 7.2 G/DL (ref 6–8.5)
RBC # BLD AUTO: 4.63 X10E6/UL (ref 3.77–5.28)
SODIUM SERPL-SCNC: 143 MMOL/L (ref 134–144)
TRIGL SERPL-MCNC: 146 MG/DL (ref 0–149)
TSH SERPL DL<=0.005 MIU/L-ACNC: 1.54 UIU/ML (ref 0.45–4.5)
VLDLC SERPL CALC-MCNC: 29 MG/DL (ref 5–40)
WBC # BLD AUTO: 8 X10E3/UL (ref 3.4–10.8)

## 2018-02-20 RX ORDER — MELOXICAM 15 MG/1
15 TABLET ORAL DAILY
Qty: 30 TAB | Refills: 1 | Status: SHIPPED | OUTPATIENT
Start: 2018-02-20 | End: 2018-03-21 | Stop reason: ALTCHOICE

## 2018-03-05 ENCOUNTER — OFFICE VISIT (OUTPATIENT)
Dept: INTERNAL MEDICINE CLINIC | Age: 48
End: 2018-03-05

## 2018-03-05 VITALS
WEIGHT: 271.8 LBS | RESPIRATION RATE: 17 BRPM | BODY MASS INDEX: 43.68 KG/M2 | DIASTOLIC BLOOD PRESSURE: 80 MMHG | OXYGEN SATURATION: 99 % | TEMPERATURE: 97.9 F | HEIGHT: 66 IN | SYSTOLIC BLOOD PRESSURE: 134 MMHG | HEART RATE: 69 BPM

## 2018-03-05 DIAGNOSIS — Z01.419 WELL WOMAN EXAM WITH ROUTINE GYNECOLOGICAL EXAM: ICD-10-CM

## 2018-03-05 DIAGNOSIS — Z12.39 SCREENING FOR BREAST CANCER: ICD-10-CM

## 2018-03-05 DIAGNOSIS — E66.01 OBESITY, MORBID (HCC): ICD-10-CM

## 2018-03-05 DIAGNOSIS — Z12.4 SCREENING FOR CERVICAL CANCER: ICD-10-CM

## 2018-03-05 DIAGNOSIS — Z01.419 WELL WOMAN EXAM: Primary | ICD-10-CM

## 2018-03-05 DIAGNOSIS — E55.9 VITAMIN D DEFICIENCY: ICD-10-CM

## 2018-03-05 RX ORDER — ERGOCALCIFEROL 1.25 MG/1
50000 CAPSULE ORAL
Qty: 16 CAP | Refills: 1 | Status: SHIPPED | OUTPATIENT
Start: 2018-03-05 | End: 2018-04-24

## 2018-03-05 NOTE — MR AVS SNAPSHOT
[facility]. [address] [id number] 
[phone] Patient: Louie Marshall MRN: [id number] LBE:[id number] Visit Information Date & Time Provider Department Dept. Phone Encounter #  
 3/5/2018  9:00 AM Edgar Schmidt MD [facility] and [address] [place] [address] [phone] [id number] Follow-up Instructions Return in about 3 months (around 6/5/2018) for Vitamin D Recheck. Follow-up and Disposition History Upcoming Health Maintenance Date Due  
 PAP AKA CERVICAL CYTOLOGY 6/30/2017 DTaP/Tdap/Td series (2 - Td) 6/30/2024 Allergies as of 3/5/2018  Review Complete On: 3/5/2018 By: Ayana Evans No Known Allergies Current Immunizations  Reviewed on 2/5/2015 Name Date Influenza High Dose Vaccine PF 11/1/2014 PPD 1/18/2010 Tdap 6/30/2014 Not reviewed this visit You Were Diagnosed With   
  
 Codes Comments Well woman exam    -  Primary ICD-10-CM: J37.365 ICD-9-CM: V72.31 Well woman exam with routine gynecological exam     ICD-10-CM: E89.418 ICD-9-CM: V72.31 Vitamin D deficiency     ICD-10-CM: E55.9 ICD-9-CM: 268.9 Screening for cervical cancer     ICD-10-CM: Z12.4 ICD-9-CM: V76.2 Obesity, morbid ([facility] [address]     ICD-10-CM: E66.01 
ICD-9-CM: 278.01   
 BMI 40.0-44.9, adult (HCC)     ICD-10-CM: Z68.41 
ICD-9-CM: V85.41 Screening for breast cancer     ICD-10-CM: Z12.31 
ICD-9-CM: V76.10 Vitals BP Pulse Temp Resp Height(growth percentile) Weight(growth percentile) 134/80 (BP 1 Location: Right arm, BP Patient Position: Sitting) 69 97.9 °F (36.6 °C) (Oral) 17 5' 6\" (1.676 m) 271 lb 12.8 oz (123.3 kg) LMP SpO2 BMI OB Status Smoking Status 02/15/2018 (Exact Date) 99% 43.87 kg/m2 Having regular periods Never Smoker BMI and BSA Data Body Mass Index Body Surface Area  
 43.87 kg/m 2 2.4 m 2 Preferred Pharmacy Pharmacy Name Phone 500 [address] [phone] Your Updated Medication List  
  
   
This list is accurate as of 3/5/18 10:39 AM.  Always use your most recent med list.  
  
  
  
  
 aspirin 81 mg tablet Take 81 mg by mouth.  
  
 biotin 2,500 mcg Tab Take  by mouth daily. cetirizine 10 mg tablet Commonly known as:  ZYRTEC Take 1 Tab by mouth daily. Indications: ALLERGIC RHINITIS  
  
 ergocalciferol 50,000 unit capsule Commonly known as:  ERGOCALCIFEROL Take 1 Cap by mouth every seven (7) days for 8 doses. Lab recheck in 8 weeks. For maintenance take 800 units daily until labs done. FISH OIL 1,000 mg Cap Generic drug:  omega-3 fatty acids-vitamin e Take 1 Cap by mouth daily. ipratropium 42 mcg (0.06 %) nasal spray Commonly known as:  ATROVENT  
2 Sprays by Both Nostrils route four (4) times daily. meloxicam 15 mg tablet Commonly known as:  MOBIC Take 1 Tab by mouth daily. metaxalone 400 mg tablet Commonly known as:  SKELAXIN Take 1 Tab by mouth three (3) times daily. ONE DAILY GUMMY VITES PO Take  by mouth. traMADol 50 mg tablet Commonly known as:  ULTRAM  
Take 1 Tab by mouth every six (6) hours as needed for Pain. Indications: Pain TYLENOL PM PO Take  by mouth. valACYclovir 1 gram tablet Commonly known as:  VALTREX Take 2 pills q12h x 2 doses Prescriptions Printed Refills  
 ergocalciferol (ERGOCALCIFEROL) 50,000 unit capsule 1 Sig: Take 1 Cap by mouth every seven (7) days for 8 doses. Lab recheck in 8 weeks. For maintenance take 800 units daily until labs done. Class: Print Route: Oral  
  
We Performed the Following PAP IG, HPV RFX HPV 16/18,45 [[id number] Custom] Follow-up Instructions Return in about 3 months (around 6/5/2018) for Vitamin D Recheck. To-Do List   
 03/05/2018   Imaging:  TK MAMMO BI SCREENING INCL CAD   
  
  
 Patient Instructions Learning About Vitamin D Why is it important to get enough vitamin D? Your body needs vitamin D to absorb calcium. Calcium keeps your bones and muscles, including your heart, healthy and strong. If your muscles don't get enough calcium, they can cramp, hurt, or feel weak. You may have long-term (chronic) muscle aches and pains. If you don't get enough vitamin D throughout life, you have an increased chance of having thin and brittle bones (osteoporosis) in your later years. Children who don't get enough vitamin D may not grow as much as others their age. They also have a chance of getting a rare disease called rickets. It causes weak bones. Vitamin D and calcium are added to many foods. And your body uses sunshine to make its own vitamin D. How much vitamin D do you need? The Salters of Medicine recommends that people ages 3 through 79 get 600 IU (international units) every day. Adults 71 and older need 800 IU every day. Blood tests for vitamin D can check your vitamin D level. But there is no standard normal range used by all laboratories. The Salters of Medicine recommends a blood level of 20 ng/mL of vitamin D for healthy bones. And most people in the [place] and [facility]) meet this goal. 
How can you get more vitamin D? Foods that contain vitamin D include: 
· [place], tuna, and mackerel. These are some of the best foods to eat when you need to get more vitamin D. 
· Cheese, egg yolks, and beef liver. These foods have vitamin D in small amounts. · Milk, soy drinks, orange juice, yogurt, margarine, and some kinds of cereal have vitamin D added to them. Some people don't make vitamin D as well as others. They may have to take extra care in getting enough vitamin D. Things that reduce how much vitamin D your body makes include: · Dark skin, such as many  Americans have. · Age, especially if you are older than 72. · Digestive problems, such as Crohn's or celiac disease. · Liver and kidney disease. Some people who do not get enough vitamin D may need supplements. Are there any risks from taking vitamin D? 
· Too much vitamin D: 
¨ Can damage your kidneys. ¨ Can cause nausea and vomiting, constipation, and weakness. ¨ Raises the amount of calcium in your blood. If this happens, you can get confused or have an irregular heart rhythm. · Vitamin D may interact with other medicines. Tell your doctor about all of the medicines you take, including over-the-counter drugs, herbs, and pills. Tell your doctor about all of your current medical problems. Where can you learn more? Go to http://vivien-jalyene.info/. Enter [id number] in the search box to learn more about \"Learning About Vitamin D.\" 
Current as of: May 12, 2017 Content Version: 11.4 © 9632-3385 [employer]. Care instructions adapted under license by [employer] (which disclaims liability or warranty for this information). If you have questions about a medical condition or this instruction, always ask your healthcare professional. [address] any warranty or liability for your use of this information. Body Mass Index: Care Instructions Your Care Instructions Body mass index (BMI) can help you see if your weight is raising your risk for health problems. It uses a formula to compare how much you weigh with how tall you are. · A BMI lower than 18.5 is considered underweight. · A BMI between 18.5 and 24.9 is considered healthy. · A BMI between 25 and 29.9 is considered overweight. A BMI of 30 or higher is considered obese. Your Body mass index is 43.87 kg/(m^2). Wt Readings from Last 3 Encounters:  
03/05/18 271 lb 12.8 oz (123.3 kg) 02/15/18 268 lb 4.8 oz (121.7 kg) 08/10/17 262 lb 14.4 oz (119.3 kg) If your BMI is in the normal range, it means that you have a lower risk for weight-related health problems. If your BMI is in the overweight or obese range, you may be at increased risk for weight-related health problems, such as high blood pressure, heart disease, stroke, arthritis or joint pain, and diabetes. If your BMI is in the underweight range, you may be at increased risk for health problems such as fatigue, lower protection (immunity) against illness, muscle loss, bone loss, hair loss, and hormone problems. BMI is just one measure of your risk for weight-related health problems. You may be at higher risk for health problems if you are not active, you eat an unhealthy diet, or you drink too much alcohol or use tobacco products. Follow-up care is a key part of your treatment and safety. Be sure to make and go to all appointments, and call your doctor if you are having problems. It's also a good idea to know your test results and keep a list of the medicines you take. How can you care for yourself at home? · Practice healthy eating habits. This includes eating plenty of fruits, vegetables, whole grains, lean protein, and low-fat dairy. · If your doctor recommends it, get more exercise. Walking is a good choice. Bit by bit, increase the amount you walk every day. Try for at least 30 minutes on most days of the week. · Do not smoke. Smoking can increase your risk for health problems. If you need help quitting, talk to your doctor about stop-smoking programs and medicines. These can increase your chances of quitting for good. · Limit alcohol to 2 drinks a day for men and 1 drink a day for women. Too much alcohol can cause health problems. If you have a BMI higher than 25 · Your doctor may do other tests to check your risk for weight-related health problems. This may include measuring the distance around your waist. A waist measurement of more than 40 inches in men or 35 inches in women can increase the risk of weight-related health problems. · Talk with your doctor about steps you can take to stay healthy or improve your health. You may need to make lifestyle changes to lose weight and stay healthy, such as changing your diet and getting regular exercise. If you have a BMI lower than 18.5 · Your doctor may do other tests to check your risk for health problems. · Talk with your doctor about steps you can take to stay healthy or improve your health. You may need to make lifestyle changes to gain or maintain weight and stay healthy, such as getting more healthy foods in your diet and doing exercises to build muscle. Health/Fitness/Weight Loss: 1. [employer] is a great free phone or online perri to track your food intake and exercise. Download the ap today. Log EVERYTHING you Eat and DRINK for at least 3 days straight. Then evaluate easy changes you can make for healthier living and be consistent. Find 300 calories daily you can easily get rid. 300 less calories every day will lead to a half pound weight loss per week. It gets better, this leads to a 25 pound weight loss in 1 year! Wow, a little bit goes a long way. 1 pound = 3,500 calories 2. Doing CARDIO the first thing in the morning will burn the STORED or EXCESS FAT in your body, rather than the food just ate! This is called fasting cardio. The body uses the fat as energy and the fat just melts away!!! 45-60 minutes of vigorous exercise 6 days per week is an optimal maintinence goal. 
3. Drinking water REDUCES BELLY FAT! Drink 4 - 16 oz bottles 8 glasses/64 ounces of water daily. 4. During your CHALLENGE don't eat processed food! Processed foods are filled with artificial ingredients and sugars that your body DOES NOT need! 5. Have a meal plan. Know what you are going to eat for every meal so that there is no guessing and you don't resort to fast food. 6. Eat whole foods. Lean meats, fruits, veggies and nuts!  In [facility] TO EAT!!!! 
 
 
YOUR PERSONAL GOALS: 
 CHOOSE 1 Food Goal to start TODAY: ___________________________________ CHOOSE 1 Activity Goal to start TODAY: __________________________________ Make the smallest step you can and be consistent! :) You'll Love the Results. You are loved. You're [place] It! Where can you learn more? Go to http://vivien-jaylene.info/. Enter S176 in the search box to learn more about \"Body Mass Index: Care Instructions. \" Current as of: October 13, 2016 Content Version: 11.4 © 5612-6040 "[employer]". Care instructions adapted under license by [employer] (which disclaims liability or warranty for this information). If you have questions about a medical condition or this instruction, always ask your healthcare professional. [address] any warranty or liability for your use of this information. Body Mass Index: Care Instructions Your Care Instructions Body mass index (BMI) can help you see if your weight is raising your risk for health problems. It uses a formula to compare how much you weigh with how tall you are. · A BMI lower than 18.5 is considered underweight. · A BMI between 18.5 and 24.9 is considered healthy. · A BMI between 25 and 29.9 is considered overweight. A BMI of 30 or higher is considered obese. If your BMI is in the normal range, it means that you have a lower risk for weight-related health problems. If your BMI is in the overweight or obese range, you may be at increased risk for weight-related health problems, such as high blood pressure, heart disease, stroke, arthritis or joint pain, and diabetes. If your BMI is in the underweight range, you may be at increased risk for health problems such as fatigue, lower protection (immunity) against illness, muscle loss, bone loss, hair loss, and hormone problems. BMI is just one measure of your risk for weight-related health problems. You may be at higher risk for health problems if you are not active, you eat an unhealthy diet, or you drink too much alcohol or use tobacco products. Follow-up care is a key part of your treatment and safety. Be sure to make and go to all appointments, and call your doctor if you are having problems. It's also a good idea to know your test results and keep a list of the medicines you take. How can you care for yourself at home? · Practice healthy eating habits. This includes eating plenty of fruits, vegetables, whole grains, lean protein, and low-fat dairy. · If your doctor recommends it, get more exercise. Walking is a good choice. Bit by bit, increase the amount you walk every day. Try for at least 30 minutes on most days of the week. · Do not smoke. Smoking can increase your risk for health problems. If you need help quitting, talk to your doctor about stop-smoking programs and medicines. These can increase your chances of quitting for good. · Limit alcohol to 2 drinks a day for men and 1 drink a day for women. Too much alcohol can cause health problems. If you have a BMI higher than 25 · Your doctor may do other tests to check your risk for weight-related health problems. This may include measuring the distance around your waist. A waist measurement of more than 40 inches in men or 35 inches in women can increase the risk of weight-related health problems. · Talk with your doctor about steps you can take to stay healthy or improve your health. You may need to make lifestyle changes to lose weight and stay healthy, such as changing your diet and getting regular exercise. If you have a BMI lower than 18.5 · Your doctor may do other tests to check your risk for health problems. · Talk with your doctor about steps you can take to stay healthy or improve your health.  You may need to make lifestyle changes to gain or maintain weight and stay healthy, such as getting more healthy foods in your diet and doing exercises to build muscle. Where can you learn more? Go to http://vivien-jaylene.info/. Enter S176 in the search box to learn more about \"Body Mass Index: Care Instructions. \" Current as of: October 13, 2016 Content Version: 11.4 © 2352-2603 [employer]. Care instructions adapted under license by [employer] (which disclaims liability or warranty for this information). If you have questions about a medical condition or this instruction, always ask your healthcare professional. [address] any warranty or liability for your use of this information. Patient Instructions History Introducing [facility]! [employer] introduces [employer] patient portal. Now you can access parts of your medical record, email your doctor's office, and request medication refills online. 1. In your internet browser, go to [email or web address] 2. Click on the First Time User? Click Here link in the Sign In box. You will see the New Member Sign Up page. 3. Enter your [employer] Access Code exactly as it appears below. You will not need to use this code after youve completed the sign-up process. If you do not sign up before the expiration date, you must request a new code. · [employer] Access Code: [email or web address] Expires: 6/3/2018 10:38 AM 
 
4. Enter the last four digits of your Social Security Number (xxxx) and Date of Birth (mm/dd/yyyy) as indicated and click Submit. You will be taken to the next sign-up page. 5. Create a [employer] ID. This will be your [employer] login ID and cannot be changed, so think of one that is secure and easy to remember. 6. Create a [employer] password. You can change your password at any time. 7. Enter your Password Reset Question and Answer. This can be used at a later time if you forget your password. 8. Enter your e-mail address.  You will receive e-mail notification when new information is available in [employer]. 9. Click Sign Up. You can now view and download portions of your medical record. 10. Click the Download Summary menu link to download a portable copy of your medical information. If you have questions, please visit the Frequently Asked Questions section of the [employer] website. Remember, [employer] is NOT to be used for urgent needs. For medical emergencies, dial 911. Now available from your iPhone and Android! Please provide this summary of care documentation to your next provider. Your primary care clinician is listed as [employer] If you have any questions after today's visit, please call [phone].

## 2018-03-05 NOTE — PROGRESS NOTES
Chief Complaint   Patient presents with    Well Woman     rm 6 with pap and breast exam, doing well no concerns     1. Have you been to the ER, urgent care clinic since your last visit? Hospitalized since your last visit? No    2. Have you seen or consulted any other health care providers outside of the [address] [place] [address] since your last visit? Include any pap smears or colon screening.  No

## 2018-03-05 NOTE — PATIENT INSTRUCTIONS
Learning About Vitamin D  Why is it important to get enough vitamin D? Your body needs vitamin D to absorb calcium. Calcium keeps your bones and muscles, including your heart, healthy and strong. If your muscles don't get enough calcium, they can cramp, hurt, or feel weak. You may have long-term (chronic) muscle aches and pains. If you don't get enough vitamin D throughout life, you have an increased chance of having thin and brittle bones (osteoporosis) in your later years. Children who don't get enough vitamin D may not grow as much as others their age. They also have a chance of getting a rare disease called rickets. It causes weak bones. Vitamin D and calcium are added to many foods. And your body uses sunshine to make its own vitamin D. How much vitamin D do you need? The [employer] recommends that people ages 3 through 79 get 600 IU (international units) every day. Adults 71 and older need 800 IU every day. Blood tests for vitamin D can check your vitamin D level. But there is no standard normal range used by all laboratories. The [employer] recommends a blood level of 20 ng/mL of vitamin D for healthy bones. And most people in the [place] and [facility] ([facility]) meet this goal.  How can you get more vitamin D? Foods that contain vitamin D include:  · [place], tuna, and mackerel. These are some of the best foods to eat when you need to get more vitamin D.  · Cheese, egg yolks, and beef liver. These foods have vitamin D in small amounts. · Milk, soy drinks, orange juice, yogurt, margarine, and some kinds of cereal have vitamin D added to them. Some people don't make vitamin D as well as others. They may have to take extra care in getting enough vitamin D. Things that reduce how much vitamin D your body makes include:  · Dark skin, such as many  Americans have. · Age, especially if you are older than 72. · Digestive problems, such as Crohn's or celiac disease.   · Liver and kidney disease. Some people who do not get enough vitamin D may need supplements. Are there any risks from taking vitamin D?  · Too much vitamin D:  ¨ Can damage your kidneys. ¨ Can cause nausea and vomiting, constipation, and weakness. ¨ Raises the amount of calcium in your blood. If this happens, you can get confused or have an irregular heart rhythm. · Vitamin D may interact with other medicines. Tell your doctor about all of the medicines you take, including over-the-counter drugs, herbs, and pills. Tell your doctor about all of your current medical problems. Where can you learn more? Go to http://vivienVALLEY FORGE COMPOSITE TECHNOLOGIESjaylene.info/. Enter [id number] in the search box to learn more about \"Learning About Vitamin D.\"  Current as of: May 12, 2017  Content Version: 11.4  © 0077-8347 [employer]. Care instructions adapted under license by [employer] (which disclaims liability or warranty for this information). If you have questions about a medical condition or this instruction, always ask your healthcare professional. [place] [address] any warranty or liability for your use of this information. Body Mass Index: Care Instructions  Your Care Instructions    Body mass index (BMI) can help you see if your weight is raising your risk for health problems. It uses a formula to compare how much you weigh with how tall you are. · A BMI lower than 18.5 is considered underweight. · A BMI between 18.5 and 24.9 is considered healthy. · A BMI between 25 and 29.9 is considered overweight. A BMI of 30 or higher is considered obese. Your Body mass index is 43.87 kg/(m^2). Wt Readings from Last 3 Encounters:   03/05/18 271 lb 12.8 oz (123.3 kg)   02/15/18 268 lb 4.8 oz (121.7 kg)   08/10/17 262 lb 14.4 oz (119.3 kg)       If your BMI is in the normal range, it means that you have a lower risk for weight-related health problems.  If your BMI is in the overweight or obese range, you may be at increased risk for weight-related health problems, such as high blood pressure, heart disease, stroke, arthritis or joint pain, and diabetes. If your BMI is in the underweight range, you may be at increased risk for health problems such as fatigue, lower protection (immunity) against illness, muscle loss, bone loss, hair loss, and hormone problems. BMI is just one measure of your risk for weight-related health problems. You may be at higher risk for health problems if you are not active, you eat an unhealthy diet, or you drink too much alcohol or use tobacco products. Follow-up care is a key part of your treatment and safety. Be sure to make and go to all appointments, and call your doctor if you are having problems. It's also a good idea to know your test results and keep a list of the medicines you take. How can you care for yourself at home? · Practice healthy eating habits. This includes eating plenty of fruits, vegetables, whole grains, lean protein, and low-fat dairy. · If your doctor recommends it, get more exercise. Walking is a good choice. Bit by bit, increase the amount you walk every day. Try for at least 30 minutes on most days of the week. · Do not smoke. Smoking can increase your risk for health problems. If you need help quitting, talk to your doctor about stop-smoking programs and medicines. These can increase your chances of quitting for good. · Limit alcohol to 2 drinks a day for men and 1 drink a day for women. Too much alcohol can cause health problems. If you have a BMI higher than 25  · Your doctor may do other tests to check your risk for weight-related health problems. This may include measuring the distance around your waist. A waist measurement of more than 40 inches in men or 35 inches in women can increase the risk of weight-related health problems. · Talk with your doctor about steps you can take to stay healthy or improve your health.  You may need to make lifestyle changes to lose weight and stay healthy, such as changing your diet and getting regular exercise. If you have a BMI lower than 18.5  · Your doctor may do other tests to check your risk for health problems. · Talk with your doctor about steps you can take to stay healthy or improve your health. You may need to make lifestyle changes to gain or maintain weight and stay healthy, such as getting more healthy foods in your diet and doing exercises to build muscle. Health/Fitness/Weight Loss: 1. [employer] is a great free phone or online perri to track your food intake and exercise. Download the ap today. Log EVERYTHING you Eat and DRINK for at least 3 days straight. Then evaluate easy changes you can make for healthier living and be consistent. Find 300 calories daily you can easily get rid. 300 less calories every day will lead to a half pound weight loss per week. It gets better, this leads to a 25 pound weight loss in 1 year! Wow, a little bit goes a long way. 1 pound = 3,500 calories    2. Doing CARDIO the first thing in the morning will burn the STORED or EXCESS FAT in your body, rather than the food just ate! This is called fasting cardio. The body uses the fat as energy and the fat just melts away!!! 45-60 minutes of vigorous exercise 6 days per week is an optimal maintinence goal.  3. Drinking water REDUCES BELLY FAT! Drink 4 - 16 oz bottles 8 glasses/64 ounces of water daily. 4. During your CHALLENGE don't eat processed food! Processed foods are filled with artificial ingredients and sugars that your body DOES NOT need! 5. Have a meal plan. Know what you are going to eat for every meal so that there is no guessing and you don't resort to fast food. 6. Eat whole foods. Lean meats, fruits, veggies and nuts!  In orderTO DROP WIEGHT YOU HAVE TO EAT!!!!      YOUR PERSONAL GOALS:  CHOOSE 1 Food Goal to start TODAY: ___________________________________    CHOOSE 1 Activity Goal to start TODAY: __________________________________    Make the smallest step you can and be consistent! :) You'll Love the Results. You are loved. You're Cebolla It! Where can you learn more? Go to http://vivien-jaylene.info/. Enter S176 in the search box to learn more about \"Body Mass Index: Care Instructions. \"  Current as of: October 13, 2016  Content Version: 11.4  © 9969-5118 [employer]. Care instructions adapted under license by [employer] (which disclaims liability or warranty for this information). If you have questions about a medical condition or this instruction, always ask your healthcare professional. [address] any warranty or liability for your use of this information. Body Mass Index: Care Instructions  Your Care Instructions    Body mass index (BMI) can help you see if your weight is raising your risk for health problems. It uses a formula to compare how much you weigh with how tall you are. · A BMI lower than 18.5 is considered underweight. · A BMI between 18.5 and 24.9 is considered healthy. · A BMI between 25 and 29.9 is considered overweight. A BMI of 30 or higher is considered obese. If your BMI is in the normal range, it means that you have a lower risk for weight-related health problems. If your BMI is in the overweight or obese range, you may be at increased risk for weight-related health problems, such as high blood pressure, heart disease, stroke, arthritis or joint pain, and diabetes. If your BMI is in the underweight range, you may be at increased risk for health problems such as fatigue, lower protection (immunity) against illness, muscle loss, bone loss, hair loss, and hormone problems. BMI is just one measure of your risk for weight-related health problems.  You may be at higher risk for health problems if you are not active, you eat an unhealthy diet, or you drink too much alcohol or use tobacco products. Follow-up care is a key part of your treatment and safety. Be sure to make and go to all appointments, and call your doctor if you are having problems. It's also a good idea to know your test results and keep a list of the medicines you take. How can you care for yourself at home? · Practice healthy eating habits. This includes eating plenty of fruits, vegetables, whole grains, lean protein, and low-fat dairy. · If your doctor recommends it, get more exercise. Walking is a good choice. Bit by bit, increase the amount you walk every day. Try for at least 30 minutes on most days of the week. · Do not smoke. Smoking can increase your risk for health problems. If you need help quitting, talk to your doctor about stop-smoking programs and medicines. These can increase your chances of quitting for good. · Limit alcohol to 2 drinks a day for men and 1 drink a day for women. Too much alcohol can cause health problems. If you have a BMI higher than 25  · Your doctor may do other tests to check your risk for weight-related health problems. This may include measuring the distance around your waist. A waist measurement of more than 40 inches in men or 35 inches in women can increase the risk of weight-related health problems. · Talk with your doctor about steps you can take to stay healthy or improve your health. You may need to make lifestyle changes to lose weight and stay healthy, such as changing your diet and getting regular exercise. If you have a BMI lower than 18.5  · Your doctor may do other tests to check your risk for health problems. · Talk with your doctor about steps you can take to stay healthy or improve your health. You may need to make lifestyle changes to gain or maintain weight and stay healthy, such as getting more healthy foods in your diet and doing exercises to build muscle. Where can you learn more? Go to http://vivien-jaylene.info/.   Enter S176 in the search box to learn more about \"Body Mass Index: Care Instructions. \"  Current as of: October 13, 2016  Content Version: 11.4  © 8297-5803 [employer], [employer]. Care instructions adapted under license by [employer] (which disclaims liability or warranty for this information). If you have questions about a medical condition or this instruction, always ask your healthcare professional. [address] any warranty or liability for your use of this information.

## 2018-03-08 LAB
CYTOLOGIST CVX/VAG CYTO: NORMAL
CYTOLOGY CVX/VAG DOC THIN PREP: NORMAL
DX ICD CODE: NORMAL
HPV I/H RISK 4 DNA CVX QL PROBE+SIG AMP: NEGATIVE
Lab: NORMAL
OTHER STN SPEC: NORMAL
PATH REPORT.FINAL DX SPEC: NORMAL
STAT OF ADQ CVX/VAG CYTO-IMP: NORMAL

## 2018-03-21 ENCOUNTER — OFFICE VISIT (OUTPATIENT)
Dept: INTERNAL MEDICINE CLINIC | Age: 48
End: 2018-03-21

## 2018-03-21 VITALS
BODY MASS INDEX: 43.86 KG/M2 | SYSTOLIC BLOOD PRESSURE: 153 MMHG | TEMPERATURE: 98.1 F | DIASTOLIC BLOOD PRESSURE: 91 MMHG | HEIGHT: 66 IN | HEART RATE: 82 BPM | RESPIRATION RATE: 17 BRPM | OXYGEN SATURATION: 97 % | WEIGHT: 272.9 LBS

## 2018-03-21 DIAGNOSIS — M62.838 TRAPEZIUS MUSCLE SPASM: ICD-10-CM

## 2018-03-21 DIAGNOSIS — M54.2 NECK PAIN: Primary | ICD-10-CM

## 2018-03-21 DIAGNOSIS — M25.511 ACUTE PAIN OF RIGHT SHOULDER: ICD-10-CM

## 2018-03-21 RX ORDER — TRAMADOL HYDROCHLORIDE 50 MG/1
100 TABLET ORAL
Qty: 10 TAB | Refills: 0 | Status: SHIPPED | OUTPATIENT
Start: 2018-03-21

## 2018-03-21 RX ORDER — TIZANIDINE HYDROCHLORIDE 2 MG/1
2 CAPSULE, GELATIN COATED ORAL
Qty: 15 CAP | Refills: 1 | Status: SHIPPED | OUTPATIENT
Start: 2018-03-21

## 2018-03-21 RX ORDER — TIZANIDINE 2 MG/1
2 TABLET ORAL
Qty: 15 TAB | Refills: 1 | Status: SHIPPED | OUTPATIENT
Start: 2018-03-21 | End: 2018-04-27 | Stop reason: SDUPTHER

## 2018-03-21 NOTE — PROGRESS NOTES
Different muscle relaxer given in case not better. BP elevated most likely due to pain. Advise recheck when feeling better   No neurosensory deficits. IM toradol given for nsaid relief. Well tolerated.    Jim Parekh MD

## 2018-03-21 NOTE — PROGRESS NOTES
Chief Complaint   Patient presents with    Neck Pain     rm 4 patient cant bend her kneck down and to the right side, patient has not slept in a week     1. Have you been to the ER, urgent care clinic since your last visit? Hospitalized since your last visit? No    2. Have you seen or consulted any other health care providers outside of the [address] [place] [address] since your last visit? Include any pap smears or colon screening.  No

## 2018-03-21 NOTE — PROGRESS NOTES
Ms. Cindi Quiñonez is a 56y.o. year old female who had concerns including Neck Pain. HPI:  Chief Complaint   Patient presents with    Neck Pain     rm 4 patient cant bend her kneck down and to the right side, patient has not slept in a week     Steroid injection in right shoulder on Friday, got some relief 2 days ago. Pt would like something for stronger pain relief. Ultram usually works. She wants to go back to work tomorrow. 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 Doubled muscle relaxer and did not get relief. Recently had to get a new job, has no PTO. History reviewed. No pertinent past medical history. Current Outpatient Prescriptions   Medication Sig Dispense    traMADol (ULTRAM) 50 mg tablet Take 2 Tabs by mouth nightly as needed for Pain. Max Daily Amount: 100 mg. 10 Tab    ergocalciferol (ERGOCALCIFEROL) 50,000 unit capsule Take 1 Cap by mouth every seven (7) days for 8 doses. Lab recheck in 8 weeks. For maintenance take 800 units daily until labs done. 16 Cap    cetirizine (ZYRTEC) 10 mg tablet Take 1 Tab by mouth daily. Indications: ALLERGIC RHINITIS 30 Tab    omega-3 fatty acids-vitamin e (FISH OIL) 1,000 mg cap Take 1 Cap by mouth daily.  biotin 2,500 mcg tab Take  by mouth daily.  FOLIC ACID/MULTIVIT-MINERALS (ONE DAILY GUMMY VITES PO) Take  by mouth.  ACETAMINOPHEN/DIPHENHYDRAMINE (TYLENOL PM PO) Take  by mouth.  aspirin 81 mg tablet Take 81 mg by mouth.  meloxicam (MOBIC) 15 mg tablet Take 1 Tab by mouth daily. 30 Tab    metaxalone (SKELAXIN) 400 mg tablet Take 1 Tab by mouth three (3) times daily. 15 Tab    ipratropium (ATROVENT) 0.06 % nasal spray 2 Sprays by Both Nostrils route four (4) times daily. 15 mL    valACYclovir (VALTREX) 1 gram tablet Take 2 pills q12h x 2 doses 4 Tab     No current facility-administered medications for this visit.         Reviewed PmHx, RxHx, FmHx, SocHx, AllgHx and updated and dated in the chart.    ROS: Negative except for BOLD  General: fever, chills, fatigue  Respiratory: cough, SOB, wheezing  Cardiovascular:  CP, palpitation, VILLAVICENCIO, edema   Gastrointestinal: N/V/D, bleeding  Genito-Urinary: dysuria, hematuria  Musculoskeletal: muscle weakness, pain, swelling    OBJECTIVE:   Visit Vitals    BP (!) 153/91 (BP 1 Location: Right arm, BP Patient Position: Sitting)    Pulse 82    Temp 98.1 °F (36.7 °C) (Oral)    Resp 17    Ht 5' 6\" (1.676 m)    Wt 272 lb 14.4 oz (123.8 kg)    LMP 02/28/2018    SpO2 97%    BMI 44.05 kg/m2     GEN: The patient appears well, alert, oriented x 3, in pain distress. ENT: bilateral TM and canal normal.  Neck supple. No adenopathy or thyromegaly. LEYDI. Lungs: clear bilaterally, good air entry, no wheezes, rhonchi or rales. Cardiovascular: regular rate and rhythm. S1 and S2 normal, no murmurs,  Abdomen: + BS, soft without tenderness, guarding, rebound, mass or organomegaly. Extremities: no edema, normal peripheral pulses. Neurological: normal, gross sensory and motor in tact without focal findings. Psych: very irritable, crying,   Neck: limited ROM, no spinal tenderness. Right trap tenderness and spasm, tender to light palpation    Assessment/ Plan:       ICD-10-CM ICD-9-CM    1. Neck pain M54.2 723.1 traMADol (ULTRAM) 50 mg tablet   2. Trapezius muscle spasm M62.838 728.85 traMADol (ULTRAM) 50 mg tablet   3. Acute pain of right shoulder M25.511 719.41 traMADol (ULTRAM) 50 mg tablet       Pt declined trigger point at this time. Tramadol for acute relief short term. Pt needs to consider her posture at work, and give time for shoulder relief limited ROM which may have provoked trap muscle spasm    I have discussed the diagnosis with the patient and the intended plan as seen in the above orders. The patient has received an after-visit summary and questions were answered concerning future plans.      Medication Side Effects and Warnings were discussed with patient.     Follow-up Disposition: Not on R Morro Perdomo MD

## 2018-03-21 NOTE — MR AVS SNAPSHOT
[facility] 
 
 
 [id number] 
[phone] Patient: Louie Marshall MRN: [id number]:[id number] Visit Information Date & Time Provider Department Dept. Phone Encounter #  
 3/21/2018  9:30 AM Misti Howe MD [facility] Medicine and [address] [place] [address] [phone] [id number] Follow-up Instructions Return if symptoms worsen or fail to improve, for Dr Zakia Romano. Follow-up and Disposition History Upcoming Health Maintenance Date Due  
 PAP AKA CERVICAL CYTOLOGY 6/30/2017 DTaP/Tdap/Td series (2 - Td) 6/30/2024 Allergies as of 3/21/2018  Review Complete On: 3/21/2018 By: Ayana Evans No Known Allergies Current Immunizations  Reviewed on 2/5/2015 Name Date Influenza High Dose Vaccine PF 11/1/2014 PPD 1/18/2010 Tdap 6/30/2014 Not reviewed this visit You Were Diagnosed With   
  
 Codes Comments Neck pain    -  Primary ICD-10-CM: M54.2 ICD-9-CM: 723.1 Trapezius muscle spasm     ICD-10-CM: E33.165 ICD-9-CM: 728.85 Acute pain of right shoulder     ICD-10-CM: M25.511 ICD-9-CM: 719.41 Vitals BP Pulse Temp Resp Height(growth percentile) Weight(growth percentile) (!) 153/91 (BP 1 Location: Right arm, BP Patient Position: Sitting) 82 98.1 °F (36.7 °C) (Oral) 17 5' 6\" (1.676 m) 272 lb 14.4 oz (123.8 kg) LMP SpO2 BMI OB Status Smoking Status 02/28/2018 97% 44.05 kg/m2 Having regular periods Never Smoker Vitals History BMI and BSA Data Body Mass Index Body Surface Area 44.05 kg/m 2 2.4 m 2 Preferred Pharmacy Pharmacy Name Phone [address] [place] [address] [phone] Your Updated Medication List  
  
   
This list is accurate as of 3/21/18 11:15 AM.  Always use your most recent med list.  
  
  
  
  
 aspirin 81 mg tablet Take 81 mg by mouth.  
  
 biotin 2,500 mcg Tab Take  by mouth daily. cetirizine 10 mg tablet Commonly known as:  ZYRTEC Take 1 Tab by mouth daily. Indications: ALLERGIC RHINITIS  
  
 ergocalciferol 50,000 unit capsule Commonly known as:  ERGOCALCIFEROL Take 1 Cap by mouth every seven (7) days for 8 doses. Lab recheck in 8 weeks. For maintenance take 800 units daily until labs done. FISH OIL 1,000 mg Cap Generic drug:  omega-3 fatty acids-vitamin e Take 1 Cap by mouth daily. ONE DAILY GUMMY VITES PO Take  by mouth. * tiZANidine 2 mg tablet Commonly known as:  Marcell Eth Take 1 Tab by mouth three (3) times daily as needed. Indications: Muscle Spasm * tiZANidine 2 mg capsule Commonly known as:  Marcell Eth Take 1 Cap by mouth three (3) times daily as needed. Indications: Muscle Spasm  
  
 traMADol 50 mg tablet Commonly known as:  ULTRAM  
Take 2 Tabs by mouth nightly as needed for Pain. Max Daily Amount: 100 mg.  
  
 TYLENOL PM PO Take  by mouth. valACYclovir 1 gram tablet Commonly known as:  VALTREX Take 2 pills q12h x 2 doses * Notice: This list has 2 medication(s) that are the same as other medications prescribed for you. Read the directions carefully, and ask your doctor or other care provider to review them with you. Prescriptions Printed Refills  
 traMADol (ULTRAM) 50 mg tablet 0 Sig: Take 2 Tabs by mouth nightly as needed for Pain. Max Daily Amount: 100 mg. Class: Print Route: Oral  
 tiZANidine (ZANAFLEX) 2 mg capsule 1 Sig: Take 1 Cap by mouth three (3) times daily as needed. Indications: Muscle Spasm Class: Print Route: Oral  
  
Prescriptions Sent to Pharmacy Refills  
 tiZANidine (ZANAFLEX) 2 mg tablet 1 Sig: Take 1 Tab by mouth three (3) times daily as needed. Indications: Muscle Spasm Class: Normal  
 Pharmacy: [facility] DR DANIELLE CORREA [address] [place] [address] Ph #: [phone]  Route: Oral  
  
 Follow-up Instructions Return if symptoms worsen or fail to improve, for Dr Zakia Romano. To-Do List   
 06/15/2018 9:45 AM  
(Arrive by 9:30 AM) Appointment with [facility] at [facility] ([phone]) Shower or bathe using soap and water. Do not use deodorant, powder, perfumes, or lotion the day of your exam.  If your prior mammograms were not performed at [facility] 6 please bring films with you or forward prior images 2 days before your procedure. Check in at registration 15min before your appointment time unless you were instructed to do otherwise. A script is not necessary, but if you have one, please bring it on the day of the mammogram or have it faxed to the department. [facility] [phone] [facility]  [phone] [address]  [phone] [facility] [phone] [place] [phone] [place] [phone] Please arrive 15 minutes prior to appointment to register Patient Instructions Back Stretches: Exercises Your Care Instructions Here are some examples of exercises for stretching your back. Start each exercise slowly. Ease off the exercise if you start to have pain. Your doctor or physical therapist will tell you when you can start these exercises and which ones will work best for you. How to do the exercises Overhead stretch 1. Stand comfortably with your feet shoulder-width apart. 2. Looking straight ahead, raise both arms over your head and reach toward the ceiling. Do not allow your head to tilt back. 3. Hold for 15 to 30 seconds, then lower your arms to your sides. 4. Repeat 2 to 4 times. Side stretch 1. Stand comfortably with your feet shoulder-width apart. 2. Raise one arm over your head, and then lean to the other side. 3. Slide your hand down your leg as you let the weight of your arm gently stretch your side muscles. Hold for 15 to 30 seconds. 4. Repeat 2 to 4 times on each side. Press-up 1. Lie on your stomach, supporting your body with your forearms. 2. Press your elbows down into the floor to raise your upper back. As you do this, relax your stomach muscles and allow your back to arch without using your back muscles. As your press up, do not let your hips or pelvis come off the floor. 3. Hold for 15 to 30 seconds, then relax. 4. Repeat 2 to 4 times. Relax and rest 
 
1. Lie on your back with a rolled towel under your neck and a pillow under your knees. Extend your arms comfortably to your sides. 2. Relax and breathe normally. 3. Remain in this position for about 10 minutes. 4. If you can, do this 2 or 3 times each day. Follow-up care is a key part of your treatment and safety. Be sure to make and go to all appointments, and call your doctor if you are having problems. It's also a good idea to know your test results and keep a list of the medicines you take. Where can you learn more? Go to http://vivien-jaylene.info/. Enter Y234 in the search box to learn more about \"Back Stretches: Exercises. \" Current as of: March 21, 2017 Content Version: 11.4 © 0541-2359 [employer]. Care instructions adapted under license by [employer] (which disclaims liability or warranty for this information). If you have questions about a medical condition or this instruction, always ask your healthcare professional. [place] [address] any warranty or liability for your use of this information. Neck: Exercises Your Care Instructions Here are some examples of typical rehabilitation exercises for your condition. Start each exercise slowly. Ease off the exercise if you start to have pain. Your doctor or physical therapist will tell you when you can start these exercises and which ones will work best for you. How to do the exercises Neck stretch 5.  This stretch works best if you keep your shoulder down as you lean away from it. To help you remember to do this, start by relaxing your shoulders and lightly holding on to your thighs or your chair. 6. Tilt your head toward your shoulder and hold for 15 to 30 seconds. Let the weight of your head stretch your muscles. 7. If you would like a little added stretch, use your hand to gently and steadily pull your head toward your shoulder. For example, keeping your right shoulder down, lean your head to the left. 8. Repeat 2 to 4 times toward each shoulder. Diagonal neck stretch 5. Turn your head slightly toward the direction you will be stretching, and tilt your head diagonally toward your chest and hold for 15 to 30 seconds. 6. If you would like a little added stretch, use your hand to gently and steadily pull your head forward on the diagonal. 
7. Repeat 2 to 4 times toward each side. Dorsal glide stretch The dorsal glide stretches the back of the neck. If you feel pain, do not glide so far back. Some people find this exercise easier to do while lying on their backs with an ice pack on the neck. 5. Sit or stand tall and look straight ahead. 6. Slowly tuck your chin as you glide your head backward over your body 7. Hold for a count of 6, and then relax for up to 10 seconds. 8. Repeat 8 to 12 times. Chest and shoulder stretch 5. Sit or stand tall and glide your head backward as in the dorsal glide stretch. 6. Raise both arms so that your hands are next to your ears. 7. Take a deep breath, and as you breathe out, lower your elbows down and behind your back. You will feel your shoulder blades slide down and together, and at the same time you will feel a stretch across your chest and the front of your shoulders. 8. Hold for about 6 seconds, and then relax for up to 10 seconds. 9. Repeat 8 to 12 times. Strengthening: Hands on head 1. Move your head backward, forward, and side to side against gentle pressure from your hands, holding each position for about 6 seconds. 2. Repeat 8 to 12 times. Follow-up care is a key part of your treatment and safety. Be sure to make and go to all appointments, and call your doctor if you are having problems. It's also a good idea to know your test results and keep a list of the medicines you take. Where can you learn more? Go to http://vivien-jaylene.info/. Enter P975 in the search box to learn more about \"Neck: Exercises. \" Current as of: March 21, 2017 Content Version: 11.4 © 2407-6758 [employer]. Care instructions adapted under license by [employer] (which disclaims liability or warranty for this information). If you have questions about a medical condition or this instruction, always ask your healthcare professional. [address] any warranty or liability for your use of this information. Introducing [facility]! [employer] introduces [employer] patient portal. Now you can access parts of your medical record, email your doctor's office, and request medication refills online. 1. In your internet browser, go to [email or web address] 2. Click on the First Time User? Click Here link in the Sign In box. You will see the New Member Sign Up page. 3. Enter your [employer] Access Code exactly as it appears below. You will not need to use this code after youve completed the sign-up process. If you do not sign up before the expiration date, you must request a new code. · [employer] Access Code: [email or web address] Expires: 6/3/2018 11:38 AM 
 
4. Enter the last four digits of your Social Security Number (xxxx) and Date of Birth (mm/dd/yyyy) as indicated and click Submit. You will be taken to the next sign-up page. 5. Create a [employer] ID. This will be your [employer] login ID and cannot be changed, so think of one that is secure and easy to remember. 6. Create a [employer] password. You can change your password at any time. 7. Enter your Password Reset Question and Answer. This can be used at a later time if you forget your password. 8. Enter your e-mail address. You will receive e-mail notification when new information is available in [address] 9. Click Sign Up. You can now view and download portions of your medical record. 10. Click the Download Summary menu link to download a portable copy of your medical information. If you have questions, please visit the Frequently Asked Questions section of the [employer] website. Remember, [employer] is NOT to be used for urgent needs. For medical emergencies, dial 911. Now available from your iPhone and Android! Please provide this summary of care documentation to your next provider. Your primary care clinician is listed as David Anne. If you have any questions after today's visit, please call [phone].

## 2018-03-21 NOTE — PATIENT INSTRUCTIONS
Back Stretches: Exercises  Your Care Instructions  Here are some examples of exercises for stretching your back. Start each exercise slowly. Ease off the exercise if you start to have pain. Your doctor or physical therapist will tell you when you can start these exercises and which ones will work best for you. How to do the exercises  Overhead stretch    1. Stand comfortably with your feet shoulder-width apart. 2. Looking straight ahead, raise both arms over your head and reach toward the ceiling. Do not allow your head to tilt back. 3. Hold for 15 to 30 seconds, then lower your arms to your sides. 4. Repeat 2 to 4 times. Side stretch    1. Stand comfortably with your feet shoulder-width apart. 2. Raise one arm over your head, and then lean to the other side. 3. Slide your hand down your leg as you let the weight of your arm gently stretch your side muscles. Hold for 15 to 30 seconds. 4. Repeat 2 to 4 times on each side. Press-up    1. Lie on your stomach, supporting your body with your forearms. 2. Press your elbows down into the floor to raise your upper back. As you do this, relax your stomach muscles and allow your back to arch without using your back muscles. As your press up, do not let your hips or pelvis come off the floor. 3. Hold for 15 to 30 seconds, then relax. 4. Repeat 2 to 4 times. Relax and rest    1. Lie on your back with a rolled towel under your neck and a pillow under your knees. Extend your arms comfortably to your sides. 2. Relax and breathe normally. 3. Remain in this position for about 10 minutes. 4. If you can, do this 2 or 3 times each day. Follow-up care is a key part of your treatment and safety. Be sure to make and go to all appointments, and call your doctor if you are having problems. It's also a good idea to know your test results and keep a list of the medicines you take. Where can you learn more? Go to http://vivien-jaylene.info/.   Enter R881 in the search box to learn more about \"Back Stretches: Exercises. \"  Current as of: March 21, 2017  Content Version: 11.4  © 3129-7862 [employer]. Care instructions adapted under license by [employer] (which disclaims liability or warranty for this information). If you have questions about a medical condition or this instruction, always ask your healthcare professional. [address] any warranty or liability for your use of this information. Neck: Exercises  Your Care Instructions  Here are some examples of typical rehabilitation exercises for your condition. Start each exercise slowly. Ease off the exercise if you start to have pain. Your doctor or physical therapist will tell you when you can start these exercises and which ones will work best for you. How to do the exercises  Neck stretch    5. This stretch works best if you keep your shoulder down as you lean away from it. To help you remember to do this, start by relaxing your shoulders and lightly holding on to your thighs or your chair. 6. Tilt your head toward your shoulder and hold for 15 to 30 seconds. Let the weight of your head stretch your muscles. 7. If you would like a little added stretch, use your hand to gently and steadily pull your head toward your shoulder. For example, keeping your right shoulder down, lean your head to the left. 8. Repeat 2 to 4 times toward each shoulder. Diagonal neck stretch    5. Turn your head slightly toward the direction you will be stretching, and tilt your head diagonally toward your chest and hold for 15 to 30 seconds. 6. If you would like a little added stretch, use your hand to gently and steadily pull your head forward on the diagonal.  7. Repeat 2 to 4 times toward each side. Dorsal glide stretch    The dorsal glide stretches the back of the neck. If you feel pain, do not glide so far back.  Some people find this exercise easier to do while lying on their backs with an ice pack on the neck. 5. Sit or stand tall and look straight ahead. 6. Slowly tuck your chin as you glide your head backward over your body  7. Hold for a count of 6, and then relax for up to 10 seconds. 8. Repeat 8 to 12 times. Chest and shoulder stretch    5. Sit or stand tall and glide your head backward as in the dorsal glide stretch. 6. Raise both arms so that your hands are next to your ears. 7. Take a deep breath, and as you breathe out, lower your elbows down and behind your back. You will feel your shoulder blades slide down and together, and at the same time you will feel a stretch across your chest and the front of your shoulders. 8. Hold for about 6 seconds, and then relax for up to 10 seconds. 9. Repeat 8 to 12 times. Strengthening: Hands on head    1. Move your head backward, forward, and side to side against gentle pressure from your hands, holding each position for about 6 seconds. 2. Repeat 8 to 12 times. Follow-up care is a key part of your treatment and safety. Be sure to make and go to all appointments, and call your doctor if you are having problems. It's also a good idea to know your test results and keep a list of the medicines you take. Where can you learn more? Go to http://vivien-jaylene.info/. Enter P975 in the search box to learn more about \"Neck: Exercises. \"  Current as of: March 21, 2017  Content Version: 11.4  © 2501-8774 Healthwise, Incorporated. Care instructions adapted under license by [employer] (which disclaims liability or warranty for this information). If you have questions about a medical condition or this instruction, always ask your healthcare professional. [address] any warranty or liability for your use of this information.

## 2018-03-22 RX ORDER — KETOROLAC TROMETHAMINE 30 MG/ML
60 INJECTION, SOLUTION INTRAMUSCULAR; INTRAVENOUS ONCE
Qty: 2 ML | Refills: 0
Start: 2018-03-22 | End: 2018-03-22

## 2018-03-26 ENCOUNTER — TELEPHONE (OUTPATIENT)
Dept: FAMILY MEDICINE CLINIC | Age: 48
End: 2018-03-26

## 2018-03-26 DIAGNOSIS — M25.511 ACUTE PAIN OF RIGHT SHOULDER: ICD-10-CM

## 2018-03-26 DIAGNOSIS — M62.838 TRAPEZIUS MUSCLE SPASM: ICD-10-CM

## 2018-03-26 DIAGNOSIS — M54.2 NECK PAIN: ICD-10-CM

## 2018-03-26 RX ORDER — TRAMADOL HYDROCHLORIDE 50 MG/1
100 TABLET ORAL
Qty: 10 TAB | Refills: 0 | OUTPATIENT
Start: 2018-03-26

## 2018-03-26 NOTE — TELEPHONE ENCOUNTER
Pt paged on call requesting refill of tramadol, advised we do not refill narcotics after hours. Pt asked what good am I then and that she called Dr Simon Andino office earlier in the day for this and never got a call back. However, it is stated in chart she was called and a VM was left that they would not refill. Pt continuously asked for refill of tramadol and became more and more upset to the point of yelling at me on the phone and I advised her to call her PCP office in the morning and she continued to yell into the phone and ask what use was I then I said I would no longer continue this conversation and ended the phone call.

## 2018-03-26 NOTE — TELEPHONE ENCOUNTER
Called the patient and left a message stating that Dr. Amanda Ovalle does not continue with controlled substances and to follow up with Dr. Michelle Lopez if she is still having pain.

## 2018-03-27 ENCOUNTER — TELEPHONE (OUTPATIENT)
Dept: INTERNAL MEDICINE CLINIC | Age: 48
End: 2018-03-27

## 2018-03-27 NOTE — TELEPHONE ENCOUNTER
I called patient back and left her a message stating if she is still in pain then she needs to make an apt with Dr. Michelle Lopez to get it evaluated.

## 2018-03-27 NOTE — TELEPHONE ENCOUNTER
----- Message from [address] [place] [address] sent at 3/27/2018  8:01 AM EDT -----  Regarding: FW: Dr. Christina Ken      ----- Message -----     From: Pedro Wade     Sent: 3/26/2018   6:32 PM       To: [facility]  Subject: Dr. Erica Macario,    Pt is returning the practice call, possibly about her refill for Tramadol. Pt was quite upset she was unable to get her prescription. Best contact number is [phone].     Thanks,  Eduardo Monsivais

## 2018-04-27 ENCOUNTER — OFFICE VISIT (OUTPATIENT)
Dept: INTERNAL MEDICINE CLINIC | Age: 48
End: 2018-04-27

## 2018-04-27 VITALS
HEART RATE: 75 BPM | TEMPERATURE: 98.5 F | DIASTOLIC BLOOD PRESSURE: 86 MMHG | BODY MASS INDEX: 42.49 KG/M2 | SYSTOLIC BLOOD PRESSURE: 138 MMHG | HEIGHT: 66 IN | OXYGEN SATURATION: 98 % | WEIGHT: 264.4 LBS | RESPIRATION RATE: 16 BRPM

## 2018-04-27 DIAGNOSIS — M54.12 CERVICAL RADICULOPATHY: Primary | ICD-10-CM

## 2018-04-27 DIAGNOSIS — E66.01 OBESITY, MORBID (HCC): ICD-10-CM

## 2018-04-27 RX ORDER — PREDNISONE 20 MG/1
80 TABLET ORAL
Qty: 20 TAB | Refills: 0 | Status: SHIPPED | OUTPATIENT
Start: 2018-04-27 | End: 2019-02-20 | Stop reason: ALTCHOICE

## 2018-04-27 NOTE — MR AVS SNAPSHOT
[address] [place] [address]. [address] [id number] 
[phone] Patient: Foster Ramirez MRN: [id number] DKM:[birth year] Visit Information Date & Time Provider Department Dept. Phone Encounter #  
 4/27/2018 12:30 PM Sterling Brice MD [facility] and [facility] [phone] [id number] Upcoming Health Maintenance Date Due  
 PAP AKA CERVICAL CYTOLOGY 6/30/2017 Influenza Age 5 to Adult 8/1/2018 DTaP/Tdap/Td series (2 - Td) 6/30/2024 Allergies as of 4/27/2018  Review Complete On: 4/27/2018 By: César Haywood No Known Allergies Current Immunizations  Reviewed on 2/5/2015 Name Date Influenza High Dose Vaccine PF 11/1/2014 PPD 1/18/2010 Tdap 6/30/2014 Not reviewed this visit Vitals BP Pulse Temp Resp Height(growth percentile) Weight(growth percentile) 138/86 75 98.5 °F (36.9 °C) (Oral) 16 5' 6\" (1.676 m) 264 lb 6.4 oz (119.9 kg) SpO2 BMI OB Status Smoking Status 98% 42.68 kg/m2 Having regular periods Never Smoker Vitals History BMI and BSA Data Body Mass Index Body Surface Area  
 42.68 kg/m 2 2.36 m 2 Preferred Pharmacy Pharmacy Name Phone [address] [place] [address] [phone] Your Updated Medication List  
  
   
This list is accurate as of 4/27/18  3:05 PM.  Always use your most recent med list.  
  
  
  
  
 aspirin 81 mg tablet Take 81 mg by mouth.  
  
 biotin 2,500 mcg Tab Take  by mouth daily. cetirizine 10 mg tablet Commonly known as:  ZYRTEC Take 1 Tab by mouth daily. Indications: ALLERGIC RHINITIS FISH OIL 1,000 mg Cap Generic drug:  omega-3 fatty acids-vitamin e Take 1 Cap by mouth daily. ONE DAILY GUMMY VITES PO Take  by mouth. tiZANidine 2 mg capsule Commonly known as:  Milagro Butler Take 1 Cap by mouth three (3) times daily as needed. Indications: Muscle Spasm  
  
 traMADol 50 mg tablet Commonly known as:  ULTRAM  
Take 2 Tabs by mouth nightly as needed for Pain. Max Daily Amount: 100 mg.  
  
 TYLENOL PM PO Take  by mouth. valACYclovir 1 gram tablet Commonly known as:  VALTREX Take 2 pills q12h x 2 doses VITAMIN C PO Take 1 Tab by mouth daily. VITAMIN D3 4,000 unit Cap Generic drug:  cholecalciferol (vitamin D3) Take 1 Cap by mouth daily. To-Do List   
 06/15/2018 9:45 AM  
(Arrive by 9:30 AM) Appointment with [facility] TK 1 at [facility] ([phone]) Shower or bathe using soap and water. Do not use deodorant, powder, perfumes, or lotion the day of your exam.  If your prior mammograms were not performed at [facility] 6 please bring films with you or forward prior images 2 days before your procedure. Check in at registration 15min before your appointment time unless you were instructed to do otherwise. A script is not necessary, but if you have one, please bring it on the day of the mammogram or have it faxed to the department. [facility] [phone] [facility]  [phone] [address]  [phone] [facility] [phone] [address] [phone] Please arrive 15 minutes prior to appointment to register Introducing [facility] & HEALTH SERVICES! [facility] introduces [employer] patient portal. Now you can access parts of your medical record, email your doctor's office, and request medication refills online. 1. In your internet browser, go to [email or web address] 2. Click on the First Time User? Click Here link in the Sign In box. You will see the New Member Sign Up page. 3. Enter your [employer] Access Code exactly as it appears below. You will not need to use this code after youve completed the sign-up process. If you do not sign up before the expiration date, you must request a new code. · [employer] Access Code: [email or web address] Expires: 6/3/2018 11:38 AM 
 
4. Enter the last four digits of your Social Security Number (xxxx) and Date of Birth (mm/dd/yyyy) as indicated and click Submit. You will be taken to the next sign-up page. 5. Create a [employer] ID. This will be your [employer] login ID and cannot be changed, so think of one that is secure and easy to remember. 6. Create a [employer] password. You can change your password at any time. 7. Enter your Password Reset Question and Answer. This can be used at a later time if you forget your password. 8. Enter your e-mail address. You will receive e-mail notification when new information is available in [address] 9. Click Sign Up. You can now view and download portions of your medical record. 10. Click the Download Summary menu link to download a portable copy of your medical information. If you have questions, please visit the Frequently Asked Questions section of the [employer] website. Remember, [employer] is NOT to be used for urgent needs. For medical emergencies, dial 911. Now available from your iPhone and Android! Please provide this summary of care documentation to your next provider. Your primary care clinician is listed as Katie Randolph. If you have any questions after today's visit, please call [phone].

## 2018-04-27 NOTE — PROGRESS NOTES
Chief Complaint   Patient presents with    Shoulder Pain     right shoulder     1. Have you been to the ER, urgent care clinic since your last visit? Hospitalized since your last visit? No    2. Have you seen or consulted any other health care providers outside of the [address] [place] [address] since your last visit? Include any pap smears or colon screening.  No

## 2018-04-29 PROBLEM — M54.12 CERVICAL RADICULOPATHY: Status: ACTIVE | Noted: 2018-04-29

## 2018-04-29 NOTE — PROGRESS NOTES
Chief Complaint   Patient presents with    Shoulder Pain     right shoulder   . SUBECTIVE:    Dominic Carmichael is a 56 y.o. female comes in for return visit continuing to complain of pain in her neck distally, right shoulder and frequently she gets numbness and tingling into her right arm. She has had various antiinflammatory agents and muscle relaxants with no improvement. The pain occurs throughout the day and is equally as bad nocturnally. It is interfering with her ability to work at this point. There has been no history of trauma. She does have a history of obesity also. Current Outpatient Prescriptions   Medication Sig Dispense Refill    cholecalciferol, vitamin D3, (VITAMIN D3) 4,000 unit cap Take 1 Cap by mouth daily.  ascorbate calcium (VITAMIN C PO) Take 1 Tab by mouth daily.  predniSONE (DELTASONE) 20 mg tablet Take 4 Tabs by mouth daily (after dinner). 20 Tab 0    traMADol (ULTRAM) 50 mg tablet Take 2 Tabs by mouth nightly as needed for Pain. Max Daily Amount: 100 mg. 10 Tab 0    cetirizine (ZYRTEC) 10 mg tablet Take 1 Tab by mouth daily. Indications: ALLERGIC RHINITIS 30 Tab 0    valACYclovir (VALTREX) 1 gram tablet Take 2 pills q12h x 2 doses 4 Tab 11    omega-3 fatty acids-vitamin e (FISH OIL) 1,000 mg cap Take 1 Cap by mouth daily.  biotin 2,500 mcg tab Take  by mouth daily.  FOLIC ACID/MULTIVIT-MINERALS (ONE DAILY GUMMY VITES PO) Take  by mouth.  ACETAMINOPHEN/DIPHENHYDRAMINE (TYLENOL PM PO) Take  by mouth.  aspirin 81 mg tablet Take 81 mg by mouth.  tiZANidine (ZANAFLEX) 2 mg capsule Take 1 Cap by mouth three (3) times daily as needed. Indications: Muscle Spasm 15 Cap 1     History reviewed. No pertinent past medical history.   Past Surgical History:   Procedure Laterality Date    HX  SECTION       No Known Allergies    REVIEW OF SYSTEMS:  Review of Systems - Negative except   ENT ROS: negative for - headaches, hearing change, nasal congestion, oral lesions, tinnitus, visual changes or   Respiratory ROS: no cough, shortness of breath, or wheezing  Cardiovascular ROS: no chest pain or dyspnea on exertion  Gastrointestinal ROS: no abdominal pain, change in bowel habits, or black or blood  Genito-Urinary ROS: no dysuria, trouble voiding, or hematuria  Musculoskeletal ROS: negative  Neurological ROS: no TIA or stroke symptoms      Social History     Social History    Marital status:      Spouse name:     Number of children: 1    Years of education: N/A     Occupational History    nurse--Cardiology      Social History Main Topics    Smoking status: Never Smoker    Smokeless tobacco: Never Used    Alcohol use No      Comment: occasionally    Drug use: No    Sexual activity: Yes     Partners: Male     Birth control/ protection: None      Comment:  of 19 yrs     Other Topics Concern    Exercise Not Asked     none     Social History Narrative    Full time bachelors degree in nursing degree        Son 25 and daughter        Work as Float nurse   r  Family History   Problem Relation Age of Onset    Diabetes Mother     Cancer Mother      thyroid    Cancer Maternal Aunt      thyroid, lung cancer    Asthma Brother     Diabetes Brother     Arrhythmia Father     Heart defect Father     Heart Surgery Father      AICD -- sudden death       OBJECTIVE:  Visit Vitals    /86    Pulse 75    Temp 98.5 °F (36.9 °C) (Oral)    Resp 16    Ht 5' 6\" (1.676 m)    Wt 264 lb 6.4 oz (119.9 kg)    SpO2 98%    BMI 42.68 kg/m2     ENT: perrla,  eom intact  NECK: supple. Thyroid normal, no JVD  CHEST: clear to ascultation and percussion   HEART: regular rate and rhythm  ABD: soft, bowel sounds active,   EXTREMITIES: no edema, pulse 1+  INTEGUMENT: clear  Neurological: Equivocally positive Adson's and Spurling's maneuver,      ASSESSMENT:  1. Cervical radiculopathy    2. Obesity, morbid ([facility] [address]        PLAN:    1.  The patient appears to have a cervical radiculopathy. I will give her prednisone 80 mg q. day pc for the next five days. She is to follow this up with meloxicam.  We will see her back in the office in the next two weeks. If there is improvement, great. If not, she will have to have an MRI of the cervical spine. 2. I encouraged weight loss but this conversation is not appropriate because of her pain currently. I will discuss on her return visit when she is better. .  Orders Placed This Encounter    cholecalciferol, vitamin D3, (VITAMIN D3) 4,000 unit cap    ascorbate calcium (VITAMIN C PO)    predniSONE (DELTASONE) 20 mg tablet       Follow-up Disposition:  Return in about 2 weeks (around 5/11/2018).       Thomas Colin MD

## 2018-05-15 ENCOUNTER — OFFICE VISIT (OUTPATIENT)
Dept: INTERNAL MEDICINE CLINIC | Age: 48
End: 2018-05-15

## 2018-05-15 VITALS
HEART RATE: 86 BPM | DIASTOLIC BLOOD PRESSURE: 82 MMHG | TEMPERATURE: 99 F | BODY MASS INDEX: 42.7 KG/M2 | HEIGHT: 66 IN | OXYGEN SATURATION: 97 % | RESPIRATION RATE: 16 BRPM | SYSTOLIC BLOOD PRESSURE: 116 MMHG | WEIGHT: 265.7 LBS

## 2018-05-15 DIAGNOSIS — E66.01 OBESITY, MORBID (HCC): ICD-10-CM

## 2018-05-15 DIAGNOSIS — M54.12 CERVICAL RADICULOPATHY: Primary | ICD-10-CM

## 2018-05-15 NOTE — MR AVS SNAPSHOT
[address] [place] [id number] 
[phone] Patient: Ryan Carcamo MRN: [id number] IQE:[birth year] Visit Information Date & Time Provider Department Dept. Phone Encounter #  
 5/15/2018  3:45 PM Emeka Contreras MD [facility] and [facility] 34 [id number] Upcoming Health Maintenance Date Due  
 PAP AKA CERVICAL CYTOLOGY 6/30/2017 Influenza Age 5 to Adult 8/1/2018 DTaP/Tdap/Td series (2 - Td) 6/30/2024 Allergies as of 5/15/2018  Review Complete On: 5/15/2018 By: Donald Pradhan No Known Allergies Current Immunizations  Reviewed on 2/5/2015 Name Date Influenza High Dose Vaccine PF 11/1/2014 PPD 1/18/2010 Tdap 6/30/2014 Not reviewed this visit Vitals BP Pulse Temp Resp Height(growth percentile) Weight(growth percentile) 116/82 86 99 °F (37.2 °C) (Oral) 16 5' 6\" (1.676 m) 265 lb 11.2 oz (120.5 kg) LMP SpO2 BMI OB Status Smoking Status 04/15/2018 (Approximate) 97% 42.89 kg/m2 Having regular periods Never Smoker Vitals History BMI and BSA Data Body Mass Index Body Surface Area  
 42.89 kg/m 2 2.37 m 2 Preferred Pharmacy Pharmacy Name Phone [address] [address] [facility] [phone] Your Updated Medication List  
  
   
This list is accurate as of 5/15/18  5:04 PM.  Always use your most recent med list.  
  
  
  
  
 aspirin 81 mg tablet Take 81 mg by mouth.  
  
 biotin 2,500 mcg Tab Take  by mouth daily. cetirizine 10 mg tablet Commonly known as:  ZYRTEC Take 1 Tab by mouth daily. Indications: ALLERGIC RHINITIS FISH OIL 1,000 mg Cap Generic drug:  omega-3 fatty acids-vitamin e Take 1 Cap by mouth daily. ONE DAILY GUMMY VITES PO Take  by mouth.  
  
 predniSONE 20 mg tablet Commonly known as:  Vianca Becker  
 Take 4 Tabs by mouth daily (after dinner). tiZANidine 2 mg capsule Commonly known as:  Reina Halbur Take 1 Cap by mouth three (3) times daily as needed. Indications: Muscle Spasm  
  
 traMADol 50 mg tablet Commonly known as:  ULTRAM  
Take 2 Tabs by mouth nightly as needed for Pain. Max Daily Amount: 100 mg.  
  
 TYLENOL PM PO Take  by mouth. valACYclovir 1 gram tablet Commonly known as:  VALTREX Take 2 pills q12h x 2 doses VITAMIN C PO Take 1 Tab by mouth daily. VITAMIN D3 4,000 unit Cap Generic drug:  cholecalciferol (vitamin D3) Take 1 Cap by mouth daily. To-Do List   
 06/15/2018 9:45 AM  
(Arrive by 9:30 AM) Appointment with [facility] TK 1 at [facility] ([phone]) Shower or bathe using soap and water. Do not use deodorant, powder, perfumes, or lotion the day of your exam.  If your prior mammograms were not performed at [facility] 6 please bring films with you or forward prior images 2 days before your procedure. Check in at registration 15min before your appointment time unless you were instructed to do otherwise. A script is not necessary, but if you have one, please bring it on the day of the mammogram or have it faxed to the department. [facility] [phone] [facility]  [phone] [address]  [phone] [facility] [phone] [address] [address] [facility] [phone] Please arrive 15 minutes prior to appointment to register Introducing [facility] & HEALTH SERVICES! [facility] introduces [employer] patient portal. Now you can access parts of your medical record, email your doctor's office, and request medication refills online. 1. In your internet browser, go to [email or web address] 2. Click on the First Time User? Click Here link in the Sign In box. You will see the New Member Sign Up page. 3. Enter your [employer] Access Code exactly as it appears below.  You will not need to use this code after youve completed the sign-up process. If you do not sign up before the expiration date, you must request a new code. · [employer] Access Code: [email or web address] Expires: 6/3/2018 11:38 AM 
 
4. Enter the last four digits of your Social Security Number (xxxx) and Date of Birth (mm/dd/yyyy) as indicated and click Submit. You will be taken to the next sign-up page. 5. Create a [employer] ID. This will be your [employer] login ID and cannot be changed, so think of one that is secure and easy to remember. 6. Create a [employer] password. You can change your password at any time. 7. Enter your Password Reset Question and Answer. This can be used at a later time if you forget your password. 8. Enter your e-mail address. You will receive e-mail notification when new information is available in [address] 9. Click Sign Up. You can now view and download portions of your medical record. 10. Click the Download Summary menu link to download a portable copy of your medical information. If you have questions, please visit the Frequently Asked Questions section of the [employer] website. Remember, [employer] is NOT to be used for urgent needs. For medical emergencies, dial 911. Now available from your iPhone and Android! Please provide this summary of care documentation to your next provider. Your primary care clinician is listed as Katie Randolph. If you have any questions after today's visit, please call [phone].

## 2018-05-15 NOTE — PROGRESS NOTES
Chief Complaint   Patient presents with    Shoulder Pain     2 week folow up for right shoulder pain     1. Have you been to the ER, urgent care clinic since your last visit? Hospitalized since your last visit? No    2. Have you seen or consulted any other health care providers outside of the [address] [place] [address] since your last visit? Include any pap smears or colon screening.  No

## 2018-05-16 NOTE — PROGRESS NOTES
Chief Complaint   Patient presents with    Shoulder Pain     2 week folow up for right shoulder pain   . SUBECTIVE:    Foster Ramirez is a 56 y.o. female She comes in for a return visit stating that she is better. There was a significant improvement in the discomfort while she was taking steroids and it has returned, but it is not as intense, although she continues to have pain in the right side of her neck and shoulder. It continues to intensify at night and she is reminded of it in the daytime. There has been no meaningful weight loss since I last saw her. The current discomfort started after she fell at work in April. She has had no prior problems with similar symptoms in the past.                Current Outpatient Prescriptions   Medication Sig Dispense Refill    cholecalciferol, vitamin D3, (VITAMIN D3) 4,000 unit cap Take 1 Cap by mouth daily.  ascorbate calcium (VITAMIN C PO) Take 1 Tab by mouth daily.  predniSONE (DELTASONE) 20 mg tablet Take 4 Tabs by mouth daily (after dinner). 20 Tab 0    traMADol (ULTRAM) 50 mg tablet Take 2 Tabs by mouth nightly as needed for Pain. Max Daily Amount: 100 mg. 10 Tab 0    tiZANidine (ZANAFLEX) 2 mg capsule Take 1 Cap by mouth three (3) times daily as needed. Indications: Muscle Spasm 15 Cap 1    cetirizine (ZYRTEC) 10 mg tablet Take 1 Tab by mouth daily. Indications: ALLERGIC RHINITIS 30 Tab 0    valACYclovir (VALTREX) 1 gram tablet Take 2 pills q12h x 2 doses 4 Tab 11    omega-3 fatty acids-vitamin e (FISH OIL) 1,000 mg cap Take 1 Cap by mouth daily.  biotin 2,500 mcg tab Take  by mouth daily.  FOLIC ACID/MULTIVIT-MINERALS (ONE DAILY GUMMY VITES PO) Take  by mouth.  ACETAMINOPHEN/DIPHENHYDRAMINE (TYLENOL PM PO) Take  by mouth.  aspirin 81 mg tablet Take 81 mg by mouth. History reviewed. No pertinent past medical history.   Past Surgical History:   Procedure Laterality Date    HX  SECTION       No Known Allergies    REVIEW OF SYSTEMS:  Review of Systems - Negative except   ENT ROS: negative for - headaches, hearing change, nasal congestion, oral lesions, tinnitus, visual changes or   Respiratory ROS: no cough, shortness of breath, or wheezing  Cardiovascular ROS: no chest pain or dyspnea on exertion  Gastrointestinal ROS: no abdominal pain, change in bowel habits, or black or blood  Genito-Urinary ROS: no dysuria, trouble voiding, or hematuria  Musculoskeletal ROS: negative  Neurological ROS: no TIA or stroke symptoms      Social History     Social History    Marital status:      Spouse name:     Number of children: 1    Years of education: N/A     Occupational History    nurse--Cardiology      Social History Main Topics    Smoking status: Never Smoker    Smokeless tobacco: Never Used    Alcohol use No      Comment: occasionally    Drug use: No    Sexual activity: Yes     Partners: Male     Birth control/ protection: None      Comment:  of 19 yrs     Other Topics Concern    Exercise Not Asked     none     Social History Narrative    Full time bachelors degree in nursing degree        Son 25 and daughter        Work as Float nurse   r  Family History   Problem Relation Age of Onset    Diabetes Mother     Cancer Mother      thyroid    Cancer Maternal Aunt      thyroid, lung cancer    Asthma Brother     Diabetes Brother     Arrhythmia Father     Heart defect Father     Heart Surgery Father      AICD -- sudden death       OBJECTIVE:  Visit Vitals    /82    Pulse 86    Temp 99 °F (37.2 °C) (Oral)    Resp 16    Ht 5' 6\" (1.676 m)    Wt 265 lb 11.2 oz (120.5 kg)    LMP 04/15/2018 (Approximate)    SpO2 97%    BMI 42.89 kg/m2     ENT: perrla,  eom intact  NECK: supple.  Thyroid normal, no JVD  CHEST: clear to ascultation and percussion   HEART: regular rate and rhythm  ABD: soft, bowel sounds active,   EXTREMITIES: no edema, pulse 1+  INTEGUMENT: clear  Neurological: Mildly positive Adson's and Spurling's maneuver to the right      ASSESSMENT:  1. Cervical radiculopathy    2. Obesity, morbid ([address]      Diagnoses and all orders for this visit:    1. Cervical radiculopathy  -     MRI CERV SPINE WO CONT; Future    2. Obesity, morbid ([address]  Assessment & Plan:  Uncontrolled, based on history, physical exam and review of pertinent labs, studies and medications; meds reconciled; lifestyle modifications recommended. Key Obesity Meds     Patient is on no anti-obesity meds. Lab Results   Component Value Date/Time    Hemoglobin A1c 5.4 02/15/2018 05:24 PM    Glucose 84 02/15/2018 05:24 PM    Cholesterol, total 181 02/15/2018 05:24 PM    HDL Cholesterol 54 02/15/2018 05:24 PM    LDL, calculated 98 02/15/2018 05:24 PM    Triglyceride 146 02/15/2018 05:24 PM    TSH 1.540 02/15/2018 05:24 PM    Sodium 143 02/15/2018 05:24 PM    Potassium 4.1 02/15/2018 05:24 PM    ALT (SGPT) 18 02/15/2018 05:24 PM    AST (SGOT) 21 02/15/2018 05:24 PM    VITAMIN D, 25-HYDROXY 17.8 02/15/2018 05:24 PM                 PLAN:    1. She has had significant improvement in her cervical radiculopathy, although she remains symptomatic. I will place her on an NSAID, but she cannot take any over-the-counter NSAIDs or aspirin products while she is taking this. I will place her on Celebrex 200 mg b.i.d. and see how she fares with this. Because of the persistence of the discomfort, which started after a fall at work, an MRI scan of the cervical spine will be obtained. 2. I encourage her to lose weight also. Further discussion of this will occur on a return visit once her principal problem resolves. .  Orders Placed This Encounter    MRI CERV SPINE WO CONT       Follow-up Disposition:  Return in about 4 weeks (around 6/12/2018).       Thomas Colin MD

## 2018-05-16 NOTE — ASSESSMENT & PLAN NOTE
Uncontrolled, based on history, physical exam and review of pertinent labs, studies and medications; meds reconciled; lifestyle modifications recommended. Key Obesity Meds     Patient is on no anti-obesity meds.         Lab Results   Component Value Date/Time    Hemoglobin A1c 5.4 02/15/2018 05:24 PM    Glucose 84 02/15/2018 05:24 PM    Cholesterol, total 181 02/15/2018 05:24 PM    HDL Cholesterol 54 02/15/2018 05:24 PM    LDL, calculated 98 02/15/2018 05:24 PM    Triglyceride 146 02/15/2018 05:24 PM    TSH 1.540 02/15/2018 05:24 PM    Sodium 143 02/15/2018 05:24 PM    Potassium 4.1 02/15/2018 05:24 PM    ALT (SGPT) 18 02/15/2018 05:24 PM    AST (SGOT) 21 02/15/2018 05:24 PM    VITAMIN D, 25-HYDROXY 17.8 02/15/2018 05:24 PM

## 2019-02-20 ENCOUNTER — OFFICE VISIT (OUTPATIENT)
Dept: INTERNAL MEDICINE CLINIC | Age: 49
End: 2019-02-20

## 2019-02-20 VITALS
HEART RATE: 89 BPM | WEIGHT: 257.4 LBS | SYSTOLIC BLOOD PRESSURE: 126 MMHG | DIASTOLIC BLOOD PRESSURE: 73 MMHG | HEIGHT: 66 IN | RESPIRATION RATE: 14 BRPM | TEMPERATURE: 98.5 F | OXYGEN SATURATION: 97 % | BODY MASS INDEX: 41.37 KG/M2

## 2019-02-20 DIAGNOSIS — J45.40 MODERATE PERSISTENT ASTHMATIC BRONCHITIS WITHOUT COMPLICATION: Primary | ICD-10-CM

## 2019-02-20 DIAGNOSIS — J06.9 UPPER RESPIRATORY TRACT INFECTION, UNSPECIFIED TYPE: ICD-10-CM

## 2019-02-20 DIAGNOSIS — E66.01 OBESITY, MORBID (HCC): ICD-10-CM

## 2019-02-20 PROBLEM — Z13.29 SCREENING FOR THYROID DISORDER: Status: RESOLVED | Noted: 2018-02-15 | Resolved: 2019-02-20

## 2019-02-20 RX ORDER — PREDNISONE 20 MG/1
20 TABLET ORAL
Qty: 21 TAB | Refills: 0 | Status: SHIPPED | OUTPATIENT
Start: 2019-02-20

## 2019-02-20 RX ORDER — AZITHROMYCIN 250 MG/1
TABLET, FILM COATED ORAL
Qty: 6 TAB | Refills: 0 | Status: SHIPPED | OUTPATIENT
Start: 2019-02-20 | End: 2019-02-25

## 2019-02-20 NOTE — PROGRESS NOTES
Chief Complaint Patient presents with  
 Other Patient states that she has been experiencing upper respiratory issues and congestion x 7 days. 1. Have you been to the ER, urgent care clinic since your last visit? Hospitalized since your last visit? No 
 
2. Have you seen or consulted any other health care providers outside of the [address] [place] [address] since your last visit? Include any pap smears or colon screening.  No

## 2019-02-21 NOTE — PROGRESS NOTES
Chief Complaint Patient presents with  
 Other Patient states that she has been experiencing upper respiratory issues and congestion x 7 days. .   
 
Larisa Melanie: 
 
Rory Snell is a 53 y.o. female Comes in for return visit complaining of upper respiratory symptoms for the last week or so. She is left now with a persistent hacky cough productive of yellowish to greenish mucus. Coughing episodes are rather severe and accompanied by audible wheezing. She does not smoke cigarettes. There has been no meaningful weight loss since I last saw her either. Current Outpatient Medications Medication Sig Dispense Refill  azithromycin (ZITHROMAX) 250 mg tablet Take 2 tablets today, then take 1 tablet daily 6 Tab 0  predniSONE (DELTASONE) 20 mg tablet Take 20 mg by mouth three (3) times daily (after meals). 21 Tab 0  
 loratadine-pseudoephedrine (CLARITIN-D 12 HOUR) 5-120 mg per tablet Take 1 Tab by mouth two (2) times a day. 30 Tab 0  cholecalciferol, vitamin D3, (VITAMIN D3) 4,000 unit cap Take 1 Cap by mouth daily.  ascorbate calcium (VITAMIN C PO) Take 1 Tab by mouth daily.  cetirizine (ZYRTEC) 10 mg tablet Take 1 Tab by mouth daily. Indications: ALLERGIC RHINITIS 30 Tab 0  
 valACYclovir (VALTREX) 1 gram tablet Take 2 pills q12h x 2 doses 4 Tab 11  
 omega-3 fatty acids-vitamin e (FISH OIL) 1,000 mg cap Take 1 Cap by mouth daily.  biotin 2,500 mcg tab Take  by mouth daily.  FOLIC ACID/MULTIVIT-MINERALS (ONE DAILY GUMMY VITES PO) Take  by mouth.  ACETAMINOPHEN/DIPHENHYDRAMINE (TYLENOL PM PO) Take  by mouth.  aspirin 81 mg tablet Take 81 mg by mouth.  traMADol (ULTRAM) 50 mg tablet Take 2 Tabs by mouth nightly as needed for Pain. Max Daily Amount: 100 mg. 10 Tab 0  
 tiZANidine (ZANAFLEX) 2 mg capsule Take 1 Cap by mouth three (3) times daily as needed. Indications: Muscle Spasm 15 Cap 1 History reviewed. No pertinent past medical history. Past Surgical History:  
Procedure Laterality Date  HX  SECTION No Known Allergies REVIEW OF SYSTEMS: 
Review of Systems - Negative except ENT ROS: negative for - headaches, hearing change, nasal congestion, oral lesions, tinnitus, visual changes or Respiratory ROS: no cough, shortness of breath, or wheezing Cardiovascular ROS: no chest pain or dyspnea on exertion Gastrointestinal ROS: no abdominal pain, change in bowel habits, or black or blood Genito-Urinary ROS: no dysuria, trouble voiding, or hematuria Musculoskeletal ROS: negative Neurological ROS: no TIA or stroke symptoms Social History Socioeconomic History  Marital status:  Spouse name:   Number of children: 1  Years of education: Not on file  Highest education level: Not on file Occupational History  Occupation: nurse--Cardiology Tobacco Use  Smoking status: Never Smoker  Smokeless tobacco: Never Used Substance and Sexual Activity  Alcohol use: No  
  Comment: occasionally  Drug use: No  
 Sexual activity: Yes  
  Partners: Male Birth control/protection: None Comment:  of 19 yrs Other Topics Concern Social History Narrative Full time bachelors degree in nursing degree Son 25 and daughter Work as Float nurse  
r Family History Problem Relation Age of Onset  Diabetes Mother  Cancer Mother   
     thyroid  Cancer Maternal Aunt   
     thyroid, lung cancer  Asthma Brother  Diabetes Brother  Arrhythmia Father  Heart defect Father  Heart Surgery Father AICD -- sudden death OBJECTIVE: 
Visit Vitals /73 Pulse 89 Temp 98.5 °F (36.9 °C) (Oral) Resp 14 Ht 5' 6\" (1.676 m) Wt 257 lb 6.4 oz (116.8 kg) SpO2 97% BMI 41.55 kg/m² ENT: perrla,  eom intact NECK: supple. Thyroid normal, no JVD CHEST: clear to ascultation and percussion HEART: regular rate and rhythm ABD: soft, bowel sounds active, EXTREMITIES: no edema, pulse 1+ INTEGUMENT: clear ASSESSMENT: 
1. Moderate persistent asthmatic bronchitis without complication 2. Upper respiratory tract infection, unspecified type 3. Obesity, morbid ([address] PLAN: 
 
1. The patient has a URI complicated by asthmatic bronchitis. She will be treated with an antibiotic, bronchodilators, specifically systemic steroids, and a decongestant. This will take another five days for symptoms to improve appreciably. 2. I encouraged weight reduction, but I do not emphasize that today because of her current acute illness. . 
Orders Placed This Encounter  azithromycin (ZITHROMAX) 250 mg tablet  predniSONE (DELTASONE) 20 mg tablet  loratadine-pseudoephedrine (CLARITIN-D 12 HOUR) 5-120 mg per tablet Follow-up Disposition: 
Return if symptoms worsen or fail to improve.  
 
 
Constance Henao MD

## 2019-10-11 ENCOUNTER — OFFICE VISIT (OUTPATIENT)
Dept: INTERNAL MEDICINE CLINIC | Age: 49
End: 2019-10-11

## 2019-10-11 VITALS
RESPIRATION RATE: 18 BRPM | OXYGEN SATURATION: 97 % | BODY MASS INDEX: 40.26 KG/M2 | HEART RATE: 67 BPM | DIASTOLIC BLOOD PRESSURE: 81 MMHG | TEMPERATURE: 98.4 F | HEIGHT: 66 IN | WEIGHT: 250.5 LBS | SYSTOLIC BLOOD PRESSURE: 134 MMHG

## 2019-10-11 DIAGNOSIS — Z23 ENCOUNTER FOR IMMUNIZATION: Primary | ICD-10-CM

## 2019-10-11 DIAGNOSIS — M54.50 ACUTE BILATERAL LOW BACK PAIN WITHOUT SCIATICA: ICD-10-CM

## 2019-10-11 DIAGNOSIS — F41.9 ANXIETY AND DEPRESSION: ICD-10-CM

## 2019-10-11 DIAGNOSIS — M54.12 CERVICAL RADICULOPATHY: ICD-10-CM

## 2019-10-11 DIAGNOSIS — Z00.00 PREVENTATIVE HEALTH CARE: ICD-10-CM

## 2019-10-11 DIAGNOSIS — F32.A ANXIETY AND DEPRESSION: ICD-10-CM

## 2019-10-11 DIAGNOSIS — E66.01 OBESITY, MORBID (HCC): ICD-10-CM

## 2019-10-11 RX ORDER — PAROXETINE 10 MG/1
10 TABLET, FILM COATED ORAL DAILY
Qty: 30 TAB | Refills: 3 | Status: SHIPPED | OUTPATIENT
Start: 2019-10-11 | End: 2021-01-08 | Stop reason: SDUPTHER

## 2019-10-11 NOTE — PROGRESS NOTES
1. Have you been to the ER, urgent care clinic since your last visit? Hospitalized since your last visit? No    2. Have you seen or consulted any other health care providers outside of the [address] [place] [address] since your last visit? Include any pap smears or colon screening.  No     Wants to talk to doctor

## 2019-10-11 NOTE — PROGRESS NOTES
[facility]  Ruth Iglesias MD, [address] [address]  Phone:  [phone]  Fax: [phone]       Chief Complaint   Patient presents with    LOW BACK PAIN     f/u   .      SUBJECTIVE:    William Nix is a 51 y.o. female Patient returns today with history of cervical radiculopathy, low back pain, obesity, and is seen for evaluation. Patient returns today, concerned about anxiety. She has tried holistic maneuvers without success, she has tried medication without success, and is seen for evaluation. Current Outpatient Medications   Medication Sig Dispense Refill    PARoxetine (PAXIL) 10 mg tablet Take 1 Tab by mouth daily. 30 Tab 3    cholecalciferol, vitamin D3, (VITAMIN D3) 4,000 unit cap Take 1 Cap by mouth daily.  ascorbate calcium (VITAMIN C PO) Take 1 Tab by mouth daily.  omega-3 fatty acids-vitamin e (FISH OIL) 1,000 mg cap Take 1 Cap by mouth daily.  biotin 2,500 mcg tab Take  by mouth daily.  FOLIC ACID/MULTIVIT-MINERALS (ONE DAILY GUMMY VITES PO) Take  by mouth.  ACETAMINOPHEN/DIPHENHYDRAMINE (TYLENOL PM PO) Take  by mouth.  aspirin 81 mg tablet Take 81 mg by mouth.  predniSONE (DELTASONE) 20 mg tablet Take 20 mg by mouth three (3) times daily (after meals). 21 Tab 0    loratadine-pseudoephedrine (CLARITIN-D 12 HOUR) 5-120 mg per tablet Take 1 Tab by mouth two (2) times a day. 30 Tab 0    traMADol (ULTRAM) 50 mg tablet Take 2 Tabs by mouth nightly as needed for Pain. Max Daily Amount: 100 mg. 10 Tab 0    tiZANidine (ZANAFLEX) 2 mg capsule Take 1 Cap by mouth three (3) times daily as needed. Indications: Muscle Spasm 15 Cap 1    cetirizine (ZYRTEC) 10 mg tablet Take 1 Tab by mouth daily.  Indications: ALLERGIC RHINITIS 30 Tab 0    valACYclovir (VALTREX) 1 gram tablet Take 2 pills q12h x 2 doses 4 Tab 11     Past Medical History:   Diagnosis Date    Anxiety and depression      Past Surgical History: Procedure Laterality Date    HX  SECTION       No Known Allergies      REVIEW OF SYSTEMS:  General: negative for - chills or fever  ENT: negative for - headaches, nasal congestion or tinnitus  Respiratory: negative for - cough, hemoptysis, shortness of breath or wheezing  Cardiovascular : negative for - chest pain, edema, palpitations or shortness of breath  Gastrointestinal: negative for - abdominal pain, blood in stools, heartburn or nausea/vomiting  Genito-Urinary: no dysuria, trouble voiding, or hematuria  Musculoskeletal: negative for - gait disturbance, joint pain, joint stiffness or joint swelling  Neurological: no TIA or stroke symptoms  Hematologic: no bruises, no bleeding, no swollen glands  Integument: no lumps, mole changes, nail changes or rash  Endocrine: no malaise/lethargy or unexpected weight changes      Social History     Socioeconomic History    Marital status:      Spouse name:     Number of children: 1    Years of education: Not on file    Highest education level: Not on file   Occupational History    Occupation: nurse--Cardiology   Tobacco Use    Smoking status: Never Smoker    Smokeless tobacco: Never Used   Substance and Sexual Activity    Alcohol use: Yes     Comment: occasionally    Drug use: No    Sexual activity: Yes     Partners: Male     Birth control/protection: None     Comment:  of 19 yrs   Other Topics Concern   Social History Narrative    Full time bachelors degree in nursing degree        Son 25 and daughter        Work as Float nurse     Family History   Problem Relation Age of Onset    Diabetes Mother     Cancer Mother         thyroid    Cancer Maternal Aunt         thyroid, lung cancer    Asthma Brother     Diabetes Brother     Arrhythmia Father     Heart defect Father     Heart Surgery Father         AICD -- sudden death       OBJECTIVE:    Visit Vitals  /81   Pulse 67   Temp 98.4 °F (36.9 °C) (Oral)   Resp 18   Ht 5' 6\" (1.676 m)   Wt 250 lb 8 oz (113.6 kg)   LMP 09/27/2019   SpO2 97%   BMI 40.43 kg/m²     CONSTITUTIONAL: well , well nourished, appears age appropriate  EYES: perrla, eom intact  ENMT:moist mucous membranes, pharynx clear  NECK: supple. Thyroid normal  RESPIRATORY: Chest: clear bilaterally   CARDIOVASCULAR: Heart: regular rate and rhythm  GASTROINTESTINAL: Abdomen: soft, bowel sounds active  HEMATOLOGIC: no pathological lymph nodes palpated  MUSCULOSKELETAL: Extremities: no edema, pulse 1+   INTEGUMENT: No unusual rashes or suspicious skin lesions noted. Nails appear normal.  NEUROLOGIC: non-focal exam   MENTAL STATUS: alert and oriented, appropriate affect           ASSESSMENT:  1. Encounter for immunization    2. Cervical radiculopathy    3. Acute bilateral low back pain without sciatica    4. Obesity, morbid ([address]    5. Preventative health care    6. Anxiety and depression      She received her immunization for the flu. The cervical and back pain issues have resolved. Morbid obesity remains an issue and we encouraged physical activity 30 minutes five days a week and a heart healthy diet. This will complete her preventive healthcare visit. Appropriate laboratory studies have been requested and she will have them done [place] [facility] [place]. We give her a prescription. For the anxiety and depression we offer psychiatry referral/psychology referral and counseling, which she declines. Will give her a small dose of Paxil 10 mg daily. She will be back to see us in one month or prn. Discussed the patient's BMI with her. The BMI follow up plan is as follows:     dietary management education, guidance, and counseling  encourage exercise  monitor weight  prescribed dietary intake  I have discussed the diagnosis with the patient and the intended plan as seen in the  orders above. The patient understands and agees with the plan.   The patient has   received an after visit summary and questions were answered concerning  future plans  Patient labs and/or xrays were reviewed  Past records were reviewed. PLAN:  .  Orders Placed This Encounter    Influenza virus vaccine (QUADRIVALENT PRES FREE SYRINGE) IM (43734)    PARoxetine (PAXIL) 10 mg tablet       Follow-up and Dispositions    · Return in about 4 weeks (around 11/8/2019). ATTENTION:   This medical record was transcribed using an electronic medical records system. Although proofread, it may and can contain electronic and spelling errors. Other human spelling and other errors may be present. Corrections may be executed at a later time. Please feel free to contact us for any clarifications as needed.

## 2019-10-11 NOTE — PATIENT INSTRUCTIONS
Vaccine Information Statement Influenza (Flu) Vaccine (Inactivated or Recombinant): What You Need to Know Many Vaccine Information Statements are available in Urdu and other languages. See www.immunize.org/vis Hojas de información sobre vacunas están disponibles en español y en muchos otros idiomas. Visite www.immunize.org/vis 1. Why get vaccinated? Influenza vaccine can prevent influenza (flu). Flu is a contagious disease that spreads around the [facility] every year, usually between October and May. Anyone can get the flu, but it is more dangerous for some people. Infants and young children, people 72years of age and older, pregnant women, and people with certain health conditions or a weakened immune system are at greatest risk of flu complications. Pneumonia, bronchitis, sinus infections and ear infections are examples of flu-related complications. If you have a medical condition, such as heart disease, cancer or diabetes, flu can make it worse. Flu can cause fever and chills, sore throat, muscle aches, fatigue, cough, headache, and runny or stuffy nose. Some people may have vomiting and diarrhea, though this is more common in children than adults. Each year thousands of people in the [facility] die from flu, and many more are hospitalized. Flu vaccine prevents millions of illnesses and flu-related visits to the doctor each year. 2. Influenza vaccines CDC recommends everyone 10months of age and older get vaccinated every flu season. Children 6 months through 6years of age may need 2 doses during a single flu season. Everyone else needs only 1 dose each flu season. It takes about 2 weeks for protection to develop after vaccination. There are many flu viruses, and they are always changing. Each year a new flu vaccine is made to protect against three or four viruses that are likely to cause disease in the upcoming flu season.  Even when the vaccine doesnt exactly match these viruses, it may still provide some protection. Influenza vaccine does not cause flu. Influenza vaccine may be given at the same time as other vaccines. 3. Talk with your health care provider Tell your vaccine provider if the person getting the vaccine: 
 Has had an allergic reaction after a previous dose of influenza vaccine, or has any severe, life-threatening allergies.  Has ever had Guillain-Barré Syndrome (also called GBS). In some cases, your health care provider may decide to postpone influenza vaccination to a future visit. People with minor illnesses, such as a cold, may be vaccinated. People who are moderately or severely ill should usually wait until they recover before getting influenza vaccine. Your health care provider can give you more information. 4. Risks of a reaction  Soreness, redness, and swelling where shot is given, fever, muscle aches, and headache can happen after influenza vaccine.  There may be a very small increased risk of Guillain-Barré Syndrome (GBS) after inactivated influenza vaccine (the flu shot). [employer] children who get the flu shot along with pneumococcal vaccine (PCV13), and/or DTaP vaccine at the same time might be slightly more likely to have a seizure caused by fever. Tell your health care provider if a child who is getting flu vaccine has ever had a seizure. People sometimes faint after medical procedures, including vaccination. Tell your provider if you feel dizzy or have vision changes or ringing in the ears. As with any medicine, there is a very remote chance of a vaccine causing a severe allergic reaction, other serious injury, or death. 5. What if there is a serious problem? An allergic reaction could occur after the vaccinated person leaves the clinic.  If you see signs of a severe allergic reaction (hives, swelling of the face and throat, difficulty breathing, a fast heartbeat, dizziness, or weakness), call 9-1-1 and get the person to the nearest hospital. 
 
For other signs that concern you, call your health care provider. Adverse reactions should be reported to the Vaccine Adverse Event Reporting System (VAERS). Your health care provider will usually file this report, or you can do it yourself. Visit the VAERS website at www.vaers. hhs.gov or call 4-587.205.2591. VAERS is only for reporting reactions, and VAERS staff do not give medical advice. 6. The National Vaccine Injury Compensation Program 
 
The [facility] Vaccine Injury Compensation Program (VICP) is a federal program that was created to compensate people who may have been injured by certain vaccines. Visit the VICP website at www.[facility].gov/vaccinecompensation or call 7-440.312.9538 to learn about the program and about filing a claim. There is a time limit to file a claim for compensation. 7. How can I learn more?  Ask your health care provider.  Call your local or state health department.  Contact the Centers for Disease Control and Prevention (CDC): 
- Call 5-715.467.1272 (4-550-PGE-INFO) or 
- Visit CDCs influenza website at www.cdc.gov/flu Vaccine Information Statement (Interim) Inactivated Influenza Vaccine 8/15/2019 
42 JOHANN Adkins [id number] Department of Health and [employer] Centers for Disease Control and Prevention Office Use Only Body Mass Index: Care Instructions Your Care Instructions Body mass index (BMI) can help you see if your weight is raising your risk for health problems. It uses a formula to compare how much you weigh with how tall you are. · A BMI lower than 18.5 is considered underweight. · A BMI between 18.5 and 24.9 is considered healthy. · A BMI between 25 and 29.9 is considered overweight. A BMI of 30 or higher is considered obese.  
If your BMI is in the normal range, it means that you have a lower risk for weight-related health problems. If your BMI is in the overweight or obese range, you may be at increased risk for weight-related health problems, such as high blood pressure, heart disease, stroke, arthritis or joint pain, and diabetes. If your BMI is in the underweight range, you may be at increased risk for health problems such as fatigue, lower protection (immunity) against illness, muscle loss, bone loss, hair loss, and hormone problems. BMI is just one measure of your risk for weight-related health problems. You may be at higher risk for health problems if you are not active, you eat an unhealthy diet, or you drink too much alcohol or use tobacco products. Follow-up care is a key part of your treatment and safety. Be sure to make and go to all appointments, and call your doctor if you are having problems. It's also a good idea to know your test results and keep a list of the medicines you take. How can you care for yourself at home? · Practice healthy eating habits. This includes eating plenty of fruits, vegetables, whole grains, lean protein, and low-fat dairy. · If your doctor recommends it, get more exercise. Walking is a good choice. Bit by bit, increase the amount you walk every day. Try for at least 30 minutes on most days of the week. · Do not smoke. Smoking can increase your risk for health problems. If you need help quitting, talk to your doctor about stop-smoking programs and medicines. These can increase your chances of quitting for good. · Limit alcohol to 2 drinks a day for men and 1 drink a day for women. Too much alcohol can cause health problems. If you have a BMI higher than 25 · Your doctor may do other tests to check your risk for weight-related health problems. This may include measuring the distance around your waist. A waist measurement of more than 40 inches in men or 35 inches in women can increase the risk of weight-related health problems. · Talk with your doctor about steps you can take to stay healthy or improve your health. You may need to make lifestyle changes to lose weight and stay healthy, such as changing your diet and getting regular exercise. If you have a BMI lower than 18.5 · Your doctor may do other tests to check your risk for health problems. · Talk with your doctor about steps you can take to stay healthy or improve your health. You may need to make lifestyle changes to gain or maintain weight and stay healthy, such as getting more healthy foods in your diet and doing exercises to build muscle. Where can you learn more? Go to http://vivien-jaylene.info/. Enter S176 in the search box to learn more about \"Body Mass Index: Care Instructions. \" Current as of: October 13, 2016 Content Version: 11.4 © 2237-7953 [employer]. Care instructions adapted under license by [employer] (which disclaims liability or warranty for this information). If you have questions about a medical condition or this instruction, always ask your healthcare professional. [address] any warranty or liability for your use of this information.

## 2019-11-10 PROBLEM — Z00.00 PREVENTATIVE HEALTH CARE: Status: RESOLVED | Noted: 2018-02-15 | Resolved: 2019-11-10

## 2021-01-08 ENCOUNTER — OFFICE VISIT (OUTPATIENT)
Dept: INTERNAL MEDICINE CLINIC | Age: 51
End: 2021-01-08
Payer: COMMERCIAL

## 2021-01-08 VITALS
OXYGEN SATURATION: 98 % | DIASTOLIC BLOOD PRESSURE: 83 MMHG | WEIGHT: 254.9 LBS | RESPIRATION RATE: 18 BRPM | TEMPERATURE: 97.9 F | HEIGHT: 66 IN | HEART RATE: 73 BPM | SYSTOLIC BLOOD PRESSURE: 135 MMHG | BODY MASS INDEX: 40.97 KG/M2

## 2021-01-08 DIAGNOSIS — F41.9 ANXIETY AND DEPRESSION: ICD-10-CM

## 2021-01-08 DIAGNOSIS — E66.01 OBESITY, MORBID (HCC): ICD-10-CM

## 2021-01-08 DIAGNOSIS — F32.A ANXIETY AND DEPRESSION: ICD-10-CM

## 2021-01-08 DIAGNOSIS — Z12.11 SCREEN FOR COLON CANCER: ICD-10-CM

## 2021-01-08 DIAGNOSIS — Z00.00 PREVENTATIVE HEALTH CARE: Primary | ICD-10-CM

## 2021-01-08 PROCEDURE — 99396 PREV VISIT EST AGE 40-64: CPT | Performed by: INTERNAL MEDICINE

## 2021-01-08 PROCEDURE — 36415 COLL VENOUS BLD VENIPUNCTURE: CPT | Performed by: INTERNAL MEDICINE

## 2021-01-08 RX ORDER — PAROXETINE 10 MG/1
10 TABLET, FILM COATED ORAL DAILY
Qty: 30 TAB | Refills: 4 | Status: SHIPPED | OUTPATIENT
Start: 2021-01-08 | End: 2022-03-17

## 2021-01-08 RX ORDER — PAROXETINE 10 MG/1
10 TABLET, FILM COATED ORAL DAILY
Qty: 30 TAB | Refills: 3 | Status: SHIPPED | OUTPATIENT
Start: 2021-01-08 | End: 2022-03-17 | Stop reason: SDUPTHER

## 2021-01-08 NOTE — PROGRESS NOTES
1. Have you been to the ER, urgent care clinic since your last visit? Hospitalized since your last visit? No    2. Have you seen or consulted any other health care providers outside of the [address] [place] [address] since your last visit? Include any pap smears or colon screening.  No     Wants to discuss anxiety and menstrual cramps

## 2021-01-08 NOTE — PROGRESS NOTES
SPORTS MEDICINE AND PRIMARY CARE  Jeffrey Lopez MD, [address] [address]  Phone:  [phone]  Fax: [phone]       Chief Complaint   Patient presents with    Anxiety   . SUBJECTIVE:    Justo Zelaya is a 53 y.o. 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 She has about a year to go. She is in the [employer]. She is under a great deal of stress. She lives with her , they are doing well. She has two children, a son, 29, who was incarcerated because he had an altercation with his wife's mother, who is a female, and now they are . They have twin boys and had to go to court. He is working now at [employer], getting promotions, so things are going well for him. The daughter is also going to have to move back home. So there is going to be more in the house, which is now putting some stress on our patient and she comes in for evaluation. Current Outpatient Medications   Medication Sig Dispense Refill    PARoxetine (PAXIL) 10 mg tablet Take 1 Tab by mouth daily. 30 Tab 4    PARoxetine (PAXIL) 10 mg tablet Take 1 Tab by mouth daily. 30 Tab 3    loratadine-pseudoephedrine (CLARITIN-D 12 HOUR) 5-120 mg per tablet Take 1 Tab by mouth two (2) times a day. 30 Tab 0    cholecalciferol, vitamin D3, (VITAMIN D3) 4,000 unit cap Take 1 Cap by mouth daily.  ascorbate calcium (VITAMIN C PO) Take 1 Tab by mouth daily.  cetirizine (ZYRTEC) 10 mg tablet Take 1 Tab by mouth daily. Indications: ALLERGIC RHINITIS 30 Tab 0    valACYclovir (VALTREX) 1 gram tablet Take 2 pills q12h x 2 doses 4 Tab 11    omega-3 fatty acids-vitamin e (FISH OIL) 1,000 mg cap Take 1 Cap by mouth daily.  biotin 2,500 mcg tab Take  by mouth daily.  FOLIC ACID/MULTIVIT-MINERALS (ONE DAILY GUMMY VITES PO) Take  by mouth.  ACETAMINOPHEN/DIPHENHYDRAMINE (TYLENOL PM PO) Take  by mouth.       aspirin 81 mg tablet Take 81 mg by mouth.  predniSONE (DELTASONE) 20 mg tablet Take 20 mg by mouth three (3) times daily (after meals). 21 Tab 0    traMADol (ULTRAM) 50 mg tablet Take 2 Tabs by mouth nightly as needed for Pain. Max Daily Amount: 100 mg. 10 Tab 0    tiZANidine (ZANAFLEX) 2 mg capsule Take 1 Cap by mouth three (3) times daily as needed.  Indications: Muscle Spasm 15 Cap 1     Past Medical History:   Diagnosis Date    Anxiety and depression     Preventative health care      Past Surgical History:   Procedure Laterality Date    HX  SECTION       No Known Allergies      REVIEW OF SYSTEMS:  General: negative for - chills or fever  ENT: negative for - headaches, nasal congestion or tinnitus  Respiratory: negative for - cough, hemoptysis, shortness of breath or wheezing  Cardiovascular : negative for - chest pain, edema, palpitations or shortness of breath  Gastrointestinal: negative for - abdominal pain, blood in stools, heartburn or nausea/vomiting  Genito-Urinary: no dysuria, trouble voiding, or hematuria  Musculoskeletal: negative for - gait disturbance, joint pain, joint stiffness or joint swelling  Neurological: no TIA or stroke symptoms  Hematologic: no bruises, no bleeding, no swollen glands  Integument: no lumps, mole changes, nail changes or rash  Endocrine: no malaise/lethargy or unexpected weight changes      Social History     Socioeconomic History    Marital status:      Spouse name:     Number of children: 1    Years of education: Not on file    Highest education level: Not on file   Occupational History    Occupation: nurse--Cardiology   Tobacco Use    Smoking status: Never Smoker    Smokeless tobacco: Never Used   Substance and Sexual Activity    Alcohol use: Yes     Comment: occasionally    Drug use: No    Sexual activity: Yes     Partners: Male     Birth control/protection: None     Comment:  of 19 yrs   Other Topics Concern   Social History Narrative    Full time bachelors degree in nursing degree        Son 25 and daughter        Work as Float nurse     Family History   Problem Relation Age of Onset    Diabetes Mother     Cancer Mother         thyroid    Cancer Maternal Aunt         thyroid, lung cancer    Asthma Brother     Diabetes Brother     Arrhythmia Father     Heart defect Father     Heart Surgery Father         AICD -- sudden death       OBJECTIVE:    Visit Vitals  /83   Pulse 73   Temp 97.9 °F (36.6 °C) (Oral)   Resp 18   Ht 5' 6\" (1.676 m)   Wt 254 lb 14.4 oz (115.6 kg)   LMP 01/07/2021   SpO2 98%   BMI 41.14 kg/m²     CONSTITUTIONAL: well , well nourished, appears age appropriate  EYES: perrla, eom intact  ENMT:moist mucous membranes, pharynx clear  NECK: supple. Thyroid normal  RESPIRATORY: Chest: clear bilaterally   CARDIOVASCULAR: Heart: regular rate and rhythm  GASTROINTESTINAL: Abdomen: soft, bowel sounds active  HEMATOLOGIC: no pathological lymph nodes palpated  MUSCULOSKELETAL: Extremities: no edema, pulse 1+   INTEGUMENT: No unusual rashes or suspicious skin lesions noted. Nails appear normal.  NEUROLOGIC: non-focal exam   MENTAL STATUS: alert and oriented, appropriate affect           ASSESSMENT:  1. Preventative health care    2. Anxiety and depression    3. Obesity, morbid ([address]    4. Screen for colon cancer      This will be a preventive healthcare visit for her. We encouraged a heart healthy, weight reducing diet and physical activity 30 minutes five days a week. She has anxiety, which is related to situational issues. Fortunately her and her  are doing well. The children will be in the home and I am sure they will do well and be on their way after a short period of time. We renew the Paxil, for which she can take on a regular basis or as needed. Appropriate lab studies have been requested. She will be back to see us as needed or at least once a year. We suggest a colonoscopy this year.       I have discussed the diagnosis with the patient and the intended plan as seen in the  Orders. The patient understands and agees with the plan. The patient has   received an after visit summary and questions were answered concerning  future plans  Patient labs and/or xrays were reviewed  Past records were reviewed. PLAN:  .  Orders Placed This Encounter    TK MAMMO BI SCREENING INCL CAD    URINALYSIS W/ RFLX MICROSCOPIC    CBC WITH AUTOMATED DIFF    METABOLIC PANEL, COMPREHENSIVE    LIPID PANEL    TSH 3RD GENERATION    HEMOGLOBIN A1C WITH EAG    REFERRAL TO GASTROENTEROLOGY    PARoxetine (PAXIL) 10 mg tablet    PARoxetine (PAXIL) 10 mg tablet       Follow-up and Dispositions    · Return in about 6 months (around 7/8/2021), or if symptoms worsen or fail to improve. ATTENTION:   This medical record was transcribed using an electronic medical records system. Although proofread, it may and can contain electronic and spelling errors. Other human spelling and other errors may be present. Corrections may be executed at a later time. Please feel free to contact us for any clarifications as needed.

## 2021-01-09 LAB
ALBUMIN SERPL-MCNC: 4 G/DL (ref 3.5–5)
ALBUMIN/GLOB SERPL: 1.3 {RATIO} (ref 1.1–2.2)
ALP SERPL-CCNC: 73 U/L (ref 45–117)
ALT SERPL-CCNC: 30 U/L (ref 12–78)
ANION GAP SERPL CALC-SCNC: 2 MMOL/L (ref 5–15)
APPEARANCE UR: CLEAR
AST SERPL-CCNC: 24 U/L (ref 15–37)
BASOPHILS # BLD: 0 K/UL (ref 0–0.1)
BASOPHILS NFR BLD: 1 % (ref 0–1)
BILIRUB SERPL-MCNC: 0.5 MG/DL (ref 0.2–1)
BILIRUB UR QL: NEGATIVE
BUN SERPL-MCNC: 8 MG/DL (ref 6–20)
BUN/CREAT SERPL: 10 (ref 12–20)
CALCIUM SERPL-MCNC: 9.6 MG/DL (ref 8.5–10.1)
CHLORIDE SERPL-SCNC: 108 MMOL/L (ref 97–108)
CHOLEST SERPL-MCNC: 190 MG/DL
CO2 SERPL-SCNC: 30 MMOL/L (ref 21–32)
COLOR UR: NORMAL
CREAT SERPL-MCNC: 0.82 MG/DL (ref 0.55–1.02)
DIFFERENTIAL METHOD BLD: ABNORMAL
EOSINOPHIL # BLD: 0.2 K/UL (ref 0–0.4)
EOSINOPHIL NFR BLD: 3 % (ref 0–7)
ERYTHROCYTE [DISTWIDTH] IN BLOOD BY AUTOMATED COUNT: 14.9 % (ref 11.5–14.5)
EST. AVERAGE GLUCOSE BLD GHB EST-MCNC: 103 MG/DL
GLOBULIN SER CALC-MCNC: 3.2 G/DL (ref 2–4)
GLUCOSE SERPL-MCNC: 83 MG/DL (ref 65–100)
GLUCOSE UR STRIP.AUTO-MCNC: NEGATIVE MG/DL
HBA1C MFR BLD: 5.2 % (ref 4–5.6)
HCT VFR BLD AUTO: 44.5 % (ref 35–47)
HDLC SERPL-MCNC: 71 MG/DL
HDLC SERPL: 2.7 {RATIO} (ref 0–5)
HGB BLD-MCNC: 13.5 G/DL (ref 11.5–16)
HGB UR QL STRIP: NEGATIVE
IMM GRANULOCYTES # BLD AUTO: 0 K/UL (ref 0–0.04)
IMM GRANULOCYTES NFR BLD AUTO: 0 % (ref 0–0.5)
KETONES UR QL STRIP.AUTO: NEGATIVE MG/DL
LDLC SERPL CALC-MCNC: 103.8 MG/DL (ref 0–100)
LEUKOCYTE ESTERASE UR QL STRIP.AUTO: NEGATIVE
LIPID PROFILE,FLP: ABNORMAL
LYMPHOCYTES # BLD: 1.8 K/UL (ref 0.8–3.5)
LYMPHOCYTES NFR BLD: 30 % (ref 12–49)
MCH RBC QN AUTO: 28 PG (ref 26–34)
MCHC RBC AUTO-ENTMCNC: 30.3 G/DL (ref 30–36.5)
MCV RBC AUTO: 92.3 FL (ref 80–99)
MONOCYTES # BLD: 0.5 K/UL (ref 0–1)
MONOCYTES NFR BLD: 9 % (ref 5–13)
NEUTS SEG # BLD: 3.5 K/UL (ref 1.8–8)
NEUTS SEG NFR BLD: 57 % (ref 32–75)
NITRITE UR QL STRIP.AUTO: NEGATIVE
NRBC # BLD: 0 K/UL (ref 0–0.01)
NRBC BLD-RTO: 0 PER 100 WBC
PH UR STRIP: 7.5 [PH] (ref 5–8)
PLATELET # BLD AUTO: 576 K/UL (ref 150–400)
PMV BLD AUTO: 9.7 FL (ref 8.9–12.9)
POTASSIUM SERPL-SCNC: 4.2 MMOL/L (ref 3.5–5.1)
PROT SERPL-MCNC: 7.2 G/DL (ref 6.4–8.2)
PROT UR STRIP-MCNC: NEGATIVE MG/DL
RBC # BLD AUTO: 4.82 M/UL (ref 3.8–5.2)
SODIUM SERPL-SCNC: 140 MMOL/L (ref 136–145)
SP GR UR REFRACTOMETRY: <1.005 (ref 1–1.03)
TRIGL SERPL-MCNC: 76 MG/DL (ref ?–150)
TSH SERPL DL<=0.05 MIU/L-ACNC: 0.65 UIU/ML (ref 0.36–3.74)
UROBILINOGEN UR QL STRIP.AUTO: 0.2 EU/DL (ref 0.2–1)
VLDLC SERPL CALC-MCNC: 15.2 MG/DL
WBC # BLD AUTO: 6 K/UL (ref 3.6–11)

## 2021-01-12 RX ORDER — IBUPROFEN 600 MG/1
600 TABLET ORAL
Qty: 30 TAB | Refills: 11 | Status: SHIPPED | OUTPATIENT
Start: 2021-01-12 | End: 2021-02-04 | Stop reason: SDUPTHER

## 2021-02-04 RX ORDER — IBUPROFEN 600 MG/1
600 TABLET ORAL
Qty: 120 TAB | Refills: 3 | Status: SHIPPED | OUTPATIENT
Start: 2021-02-04

## 2022-03-17 RX ORDER — PAROXETINE 10 MG/1
10 TABLET, FILM COATED ORAL DAILY
Qty: 30 TABLET | Refills: 3 | Status: SHIPPED | OUTPATIENT
Start: 2022-03-17

## 2022-03-19 PROBLEM — E66.01 OBESITY, MORBID (HCC): Status: ACTIVE | Noted: 2018-03-05

## 2022-03-19 PROBLEM — M54.50 ACUTE BILATERAL LOW BACK PAIN WITHOUT SCIATICA: Status: ACTIVE | Noted: 2018-02-15

## 2022-03-19 PROBLEM — M54.12 CERVICAL RADICULOPATHY: Status: ACTIVE | Noted: 2018-04-29

## 2023-02-13 ENCOUNTER — APPOINTMENT (OUTPATIENT)
Dept: GENERAL RADIOLOGY | Age: 53
End: 2023-02-13
Attending: EMERGENCY MEDICINE
Payer: OTHER MISCELLANEOUS

## 2023-02-13 ENCOUNTER — HOSPITAL ENCOUNTER (EMERGENCY)
Age: 53
Discharge: HOME | End: 2023-02-13
Attending: EMERGENCY MEDICINE
Payer: OTHER MISCELLANEOUS

## 2023-02-13 VITALS
RESPIRATION RATE: 16 BRPM | OXYGEN SATURATION: 99 % | WEIGHT: 250 LBS | DIASTOLIC BLOOD PRESSURE: 80 MMHG | TEMPERATURE: 98 F | HEART RATE: 97 BPM | HEIGHT: 66 IN | SYSTOLIC BLOOD PRESSURE: 159 MMHG | BODY MASS INDEX: 40.18 KG/M2

## 2023-02-13 DIAGNOSIS — M54.50 ACUTE LOW BACK PAIN WITHOUT SCIATICA, UNSPECIFIED BACK PAIN LATERALITY: ICD-10-CM

## 2023-02-13 DIAGNOSIS — M79.652 LEFT THIGH PAIN: ICD-10-CM

## 2023-02-13 DIAGNOSIS — M25.552 LEFT HIP PAIN: Primary | ICD-10-CM

## 2023-02-13 PROCEDURE — 73552 X-RAY EXAM OF FEMUR 2/>: CPT

## 2023-02-13 PROCEDURE — 74011250637 HC RX REV CODE- 250/637: Performed by: EMERGENCY MEDICINE

## 2023-02-13 PROCEDURE — 99283 EMERGENCY DEPT VISIT LOW MDM: CPT

## 2023-02-13 PROCEDURE — 72100 X-RAY EXAM L-S SPINE 2/3 VWS: CPT

## 2023-02-13 PROCEDURE — 72170 X-RAY EXAM OF PELVIS: CPT

## 2023-02-13 RX ORDER — IBUPROFEN 800 MG/1
800 TABLET ORAL
Qty: 20 TABLET | Refills: 0 | Status: SHIPPED | OUTPATIENT
Start: 2023-02-13

## 2023-02-13 RX ORDER — IBUPROFEN 800 MG/1
800 TABLET ORAL
Status: COMPLETED | OUTPATIENT
Start: 2023-02-13 | End: 2023-02-13

## 2023-02-13 RX ADMIN — IBUPROFEN 800 MG: 800 TABLET, FILM COATED ORAL at 14:56

## 2023-02-13 NOTE — ED NOTES
The patient was discharged home in stable condition. The patient is alert and oriented, in no respiratory distress and discharge vital signs obtained. The patient's diagnosis, condition and treatment were explained. The patient expressed understanding. One prescription given . No work/school note given. A discharge plan has been developed. A  was not involved in the process. Aftercare instructions were given. Pt ambulatory out of the ED.

## 2023-02-13 NOTE — ED TRIAGE NOTES
Pt reports that she tripped over a crate in the floor upstairs; c/o back pain,left leg pain, groin pain, pain behind knee, and pelvis; pt to room via stretcher after assisting from [facility]; pain 5/10  throbbing and prickley.

## 2023-02-13 NOTE — ED PROVIDER NOTES
Fall       Healthy 51y F here after a mechanical fall. Tripped on a crate on the ground. Did the splits and fell down. Didn't hit her head or have LOC. Occurred about an hour ago. Complains of low back pain, L groin pain, and L femur pain. No weakness or numbness. Was in her usual state of health prior to the fall.     Past Medical History:   Diagnosis Date    Anxiety and depression     Preventative health care        Past Surgical History:   Procedure Laterality Date    HX  SECTION           Family History:   Problem Relation Age of Onset    Diabetes Mother     Cancer Mother         thyroid    Cancer Maternal Aunt         thyroid, lung cancer    Asthma Brother     Diabetes Brother     Arrhythmia Father     Heart defect Father     Heart Surgery Father         AICD -- sudden death       Social History     Socioeconomic History    Marital status:      Spouse name:     Number of children: 1    Years of education: Not on file    Highest education level: Not on file   Occupational History    Occupation: nurse--Cardiology   Tobacco Use    Smoking status: Never    Smokeless tobacco: Never   Vaping Use    Vaping Use: Never used   Substance and Sexual Activity    Alcohol use: Yes     Comment: occasionally    Drug use: No    Sexual activity: Yes     Partners: Male     Birth control/protection: None     Comment:  of 26 yrs   Other Topics Concern     Service Not Asked    Blood Transfusions Not Asked    Caffeine Concern Not Asked    Occupational Exposure Not Asked    Hobby Hazards Not Asked    Sleep Concern Not Asked    Stress Concern Not Asked    Weight Concern Not Asked    Special Diet Not Asked    Back Care Not Asked    Exercise Not Asked     Comment: none    Bike Helmet Not Asked    Seat Belt Not Asked    Self-Exams Not Asked   Social History Narrative    Full time bachelors degree in nursing degree        Son 25 and daughter        Work as Float nurse     Social Determinants of Health Financial Resource Strain: Not on file   Food Insecurity: Not on file   Transportation Needs: Not on file   Physical Activity: Not on file   Stress: Not on file   Social Connections: Not on file   Intimate Partner Violence: Not on file   Housing Stability: Not on file         ALLERGIES: Patient has no known allergies. Review of Systems  Review of Systems   Constitutional: (-) weight loss. HEENT: (-) stiff neck   Eyes: (-) discharge. Respiratory: (-) cough. Cardiovascular: (-) syncope. Gastrointestinal: (-) blood in stool. Genitourinary: (-) hematuria. Musculoskeletal: (-) myalgias. Neurological: (-) seizure. Skin: (-) petechiae  Lymph/Immunologic: (-) enlarged lymph nodes  All other systems reviewed and are negative. There were no vitals filed for this visit. Physical Exam   Nursing note and vitals reviewed. Constitutional: oriented to person, place, and time. appears well-developed and well-nourished. No distress. Head: Normocephalic and atraumatic. Nose: No rhinorrhea  Mouth/Throat: Oropharynx is clear and moist.  Eyes: Conjunctivae are normal.  Neck: Painless normal range of motion. Cardiovascular: Normal rate  Pulmonary/Chest:  No respiratory distress  Back: no tenderness to palpation or deformities. Extremities/Musculoskeletal: pain in the L hip, L groin, and L femur. Distal extremities are neurovasc intact. Neurological:  Alert and oriented to person, place, and time. Coordination normal. CN 2-12 intact. Motor and sensory function intact. Skin: Skin is warm and dry. No rash noted. No pallor. Medical Decision Making  Amount and/or Complexity of Data Reviewed  Radiology: ordered. Risk  Prescription drug management. 51y F here with back, L hip, and L leg pain after a mechanical fall.        Procedures

## 2023-02-15 ENCOUNTER — NURSE TRIAGE (OUTPATIENT)
Dept: OTHER | Facility: CLINIC | Age: 53
End: 2023-02-15

## 2023-02-15 NOTE — TELEPHONE ENCOUNTER
Location of patient: [address] patient to complete WPI triage. Location of employment: [employer]    Department where injury occurred:  see above    Location of injury (body part involved): Lower back down left leg     Time of injury: 2/13/2023 around 1245    Last 4 of SSN: [id number]    Subjective: Caller states \"slipped on an egg crate brought in by a \"     Current Symptoms: She was directed to the ED by [facility] on 2/13/2023 without going thru [facility] triage. She received care on 2/13/2023 in the ED. She was diagnosed with acute lower back with sciatica. Was directed to follow up with PCP in 2 days. She continues to have pain. Christine called this morning and was sent a list of providers covered by workers comp. She had just left a provider visit at [facility] in [place] at time of call. She was given restrictions and not fully released to full duty till 2/23/2023. Christine states [employer] faxed restrictions to [employer] today. Pain Severity: 6/10; sharp and vibration sensation; constant    Temperature: NA     What has been tried: OTC medication/Aleve Rx by ED provider    LMP: NA Pregnant: Unknown    Recommended disposition:  Follow up as recommended by provider today. Employee injury packet sent to employee with listing of approved providers and locations on call earlier in day 2/15/2023 Employee aware they must be seen by one of the panel physicians, not their own PCP. Employee verbalizes understanding. Denies any other questions or concerns; instructed to call back for any new or worsening symptoms. Patient agrees to follow up as provider recommended. Reason for Disposition   Caller has already spoken with the PCP and has no further questions.     Protocols used: No Contact or Duplicate Contact [facility]

## 2023-02-20 ENCOUNTER — APPOINTMENT (OUTPATIENT)
Dept: CT IMAGING | Age: 53
End: 2023-02-20
Attending: EMERGENCY MEDICINE
Payer: COMMERCIAL

## 2023-02-20 ENCOUNTER — OFFICE VISIT (OUTPATIENT)
Dept: INTERNAL MEDICINE CLINIC | Age: 53
End: 2023-02-20
Payer: COMMERCIAL

## 2023-02-20 ENCOUNTER — HOSPITAL ENCOUNTER (EMERGENCY)
Age: 53
Discharge: HOME | End: 2023-02-20
Attending: EMERGENCY MEDICINE
Payer: COMMERCIAL

## 2023-02-20 VITALS
BODY MASS INDEX: 43.23 KG/M2 | DIASTOLIC BLOOD PRESSURE: 85 MMHG | RESPIRATION RATE: 16 BRPM | OXYGEN SATURATION: 99 % | TEMPERATURE: 98.6 F | WEIGHT: 269 LBS | HEIGHT: 66 IN | HEART RATE: 99 BPM | SYSTOLIC BLOOD PRESSURE: 142 MMHG

## 2023-02-20 VITALS
WEIGHT: 261.69 LBS | OXYGEN SATURATION: 95 % | RESPIRATION RATE: 18 BRPM | DIASTOLIC BLOOD PRESSURE: 138 MMHG | TEMPERATURE: 99.1 F | HEIGHT: 66 IN | HEART RATE: 104 BPM | BODY MASS INDEX: 42.06 KG/M2 | SYSTOLIC BLOOD PRESSURE: 204 MMHG

## 2023-02-20 DIAGNOSIS — M54.42 ACUTE LEFT-SIDED LOW BACK PAIN WITH LEFT-SIDED SCIATICA: Primary | ICD-10-CM

## 2023-02-20 DIAGNOSIS — Z01.419 WOMEN'S ANNUAL ROUTINE GYNECOLOGICAL EXAMINATION: ICD-10-CM

## 2023-02-20 DIAGNOSIS — E66.01 OBESITY, MORBID (HCC): ICD-10-CM

## 2023-02-20 DIAGNOSIS — R06.02 SOB (SHORTNESS OF BREATH): ICD-10-CM

## 2023-02-20 DIAGNOSIS — Z12.11 SCREEN FOR COLON CANCER: ICD-10-CM

## 2023-02-20 DIAGNOSIS — M54.42 ACUTE BILATERAL LOW BACK PAIN WITH LEFT-SIDED SCIATICA: ICD-10-CM

## 2023-02-20 DIAGNOSIS — E78.5 DYSLIPIDEMIA: Primary | ICD-10-CM

## 2023-02-20 DIAGNOSIS — S76.302A LEFT HAMSTRING INJURY, INITIAL ENCOUNTER: ICD-10-CM

## 2023-02-20 PROBLEM — M54.9 BACK PAIN: Status: ACTIVE | Noted: 2023-02-13

## 2023-02-20 PROCEDURE — 96372 THER/PROPH/DIAG INJ SC/IM: CPT

## 2023-02-20 PROCEDURE — 72192 CT PELVIS W/O DYE: CPT

## 2023-02-20 PROCEDURE — 99284 EMERGENCY DEPT VISIT MOD MDM: CPT

## 2023-02-20 PROCEDURE — 74011000250 HC RX REV CODE- 250: Performed by: STUDENT IN AN ORGANIZED HEALTH CARE EDUCATION/TRAINING PROGRAM

## 2023-02-20 PROCEDURE — 99214 OFFICE O/P EST MOD 30 MIN: CPT | Performed by: INTERNAL MEDICINE

## 2023-02-20 PROCEDURE — 74011250636 HC RX REV CODE- 250/636: Performed by: EMERGENCY MEDICINE

## 2023-02-20 PROCEDURE — 72131 CT LUMBAR SPINE W/O DYE: CPT

## 2023-02-20 RX ORDER — METHOCARBAMOL 750 MG/1
750 TABLET, FILM COATED ORAL
Qty: 20 TABLET | Refills: 0 | Status: SHIPPED | OUTPATIENT
Start: 2023-02-20

## 2023-02-20 RX ORDER — KETOROLAC TROMETHAMINE 10 MG/1
TABLET, FILM COATED ORAL
COMMUNITY
Start: 2023-02-15

## 2023-02-20 RX ORDER — METHYLPREDNISOLONE 4 MG/1
TABLET ORAL
COMMUNITY
Start: 2023-02-15

## 2023-02-20 RX ORDER — LIDOCAINE 4 G/100G
1 PATCH TOPICAL
Status: DISCONTINUED | OUTPATIENT
Start: 2023-02-20 | End: 2023-02-20 | Stop reason: HOSPADM

## 2023-02-20 RX ORDER — KETOROLAC TROMETHAMINE 30 MG/ML
30 INJECTION, SOLUTION INTRAMUSCULAR; INTRAVENOUS
Status: COMPLETED | OUTPATIENT
Start: 2023-02-20 | End: 2023-02-20

## 2023-02-20 RX ORDER — MORPHINE SULFATE 10 MG/ML
6 INJECTION, SOLUTION INTRAMUSCULAR; INTRAVENOUS
Status: DISCONTINUED | OUTPATIENT
Start: 2023-02-20 | End: 2023-02-20 | Stop reason: HOSPADM

## 2023-02-20 RX ADMIN — KETOROLAC TROMETHAMINE 30 MG: 30 INJECTION, SOLUTION INTRAMUSCULAR; INTRAVENOUS at 17:52

## 2023-02-20 NOTE — ED TRIAGE NOTES
Patient had a fall at work on Monday, was seen in [facility] ED and has been taking taking prednisone since 2/15. Reports worsening of bilateral lower back pain, left leg pain, tingling sensation down left leg with numbness to left foot. States pain is worse when touched.

## 2023-02-20 NOTE — PROGRESS NOTES
SPORTS MEDICINE AND PRIMARY CARE  Evangelina Yoon MD, [address] [address]  Phone:  [phone]  Fax: [phone]       Chief Complaint   Patient presents with    Complete Physical     Patient is here for a wellness visit. .      SUBJECTIVE:    Courtney Velazquez is a 47 y.o. female Last Monday she fell at work and is now going to be in [employer] for her back pain. She is having severe left lumbar discomfort with radiation down her left leg. She has difficulty sitting because of the discomfort. She had been given Toradol, which has not been helpful. Patient is seen for evaluation. She wants her yearly checkup. Current Outpatient Medications   Medication Sig Dispense Refill    ketorolac (TORADOL) 10 mg tablet TAKE 1 TABLET BY MOUTH THREE TIMES DAILY WITH FOOD FOR 5 DAYS      methylPREDNISolone (MEDROL DOSEPACK) 4 mg tablet TAKE BY MOUTH AS DIRECTED ON INSIDE OF PACKAGE      cholecalciferol, vitamin D3, 100 mcg (4,000 unit) cap Take 1 Cap by mouth daily. ascorbate calcium (VITAMIN C PO) Take 1 Tab by mouth daily. cetirizine (ZYRTEC) 10 mg tablet Take 1 Tab by mouth daily. Indications: ALLERGIC RHINITIS 30 Tab 0    biotin 2,500 mcg tab Take  by mouth daily. FOLIC ACID/MULTIVIT-MINERALS (ONE DAILY GUMMY VITES PO) Take  by mouth. ACETAMINOPHEN/DIPHENHYDRAMINE (TYLENOL PM PO) Take  by mouth. aspirin 81 mg tablet Take 81 mg by mouth.        Past Medical History:   Diagnosis Date    Anxiety and depression     Back pain 2023    fall at work - work com    Preventative health care      Past Surgical History:   Procedure Laterality Date    HX  SECTION       No Known Allergies      REVIEW OF SYSTEMS:  General: negative for - chills or fever  ENT: negative for - headaches, nasal congestion or tinnitus  Respiratory: negative for - cough, hemoptysis, shortness of breath or wheezing  Cardiovascular : negative for - chest pain, edema, palpitations or shortness of breath  Gastrointestinal: negative for - abdominal pain, blood in stools, heartburn or nausea/vomiting  Genito-Urinary: no dysuria, trouble voiding, or hematuria  Musculoskeletal: negative for - gait disturbance, joint pain, joint stiffness or joint swelling  Neurological: no TIA or stroke symptoms  Hematologic: no bruises, no bleeding, no swollen glands  Integument: no lumps, mole changes, nail changes or rash  Endocrine: no malaise/lethargy or unexpected weight changes      Social History     Socioeconomic History    Marital status:      Spouse name:     Number of children: 1   Occupational History    Occupation: nurse--Cardiology   Tobacco Use    Smoking status: Never    Smokeless tobacco: Never   Vaping Use    Vaping Use: Never used   Substance and Sexual Activity    Alcohol use: Yes     Comment: occasionally    Drug use: No    Sexual activity: Yes     Partners: Male     Birth control/protection: None     Comment:  of 26 yrs   Social History Narrative    Full time bachelors degree in nursing 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   Sabianism preferences Zoroastrianism        Family history: Father is [de-identified] with hypertension and cardiac disease, mother is [de-identified] with diabetes and lung cancer 1 sister is alive and well 54-year-old brother  complications of diabetes                 Family History   Problem Relation Age of Onset    Diabetes Mother     Cancer Mother         thyroid    Cancer Maternal Aunt         thyroid, lung cancer    Asthma Brother     Diabetes Brother     Arrhythmia Father     Heart defect Father     Heart Surgery Father         AICD -- sudden death       OBJECTIVE:    Visit Vitals  BP (!) 142/85   Pulse 99   Temp 98.6 °F (37 °C)   Resp 16   Ht 5' 6\" (1.676 m)   Wt 269 lb (122 kg)   LMP 02/06/2023   SpO2 99%   BMI 43.42 kg/m²     CONSTITUTIONAL: well , well nourished, appears age appropriate  EYES: perrla, eom intact  ENMT:moist mucous membranes, pharynx clear  NECK: supple. Thyroid normal  RESPIRATORY: Chest: clear bilaterally   CARDIOVASCULAR: Heart: regular rate and rhythm  GASTROINTESTINAL: Abdomen: soft, bowel sounds active  HEMATOLOGIC: no pathological lymph nodes palpated  MUSCULOSKELETAL: Extremities: no edema, pulse 1+   INTEGUMENT: No unusual rashes or suspicious skin lesions noted. Nails appear normal.  NEUROLOGIC: non-focal exam   MENTAL STATUS: alert and oriented, appropriate affect           ASSESSMENT:  1. Dyslipidemia    2. Obesity, morbid ([address]    3. Acute bilateral low back pain with left-sided sciatica    4. Women's annual routine gynecological examination    5. SOB (shortness of breath)    6. Screen for colon cancer      There is a history of dyslipidemia, for which we will check a lipid panel, as well as a TSH. Morbid obesity remains a concern and we encourage a heart healthy, weight reducing diet. She has acute left lumbar discomfort, for which she is going to the emergency room for evaluation under workman's comp. We therefore do not make recommendations because of that decision she made. She complains of shortness of breath and would like a cardiac evaluation, specifically wants to see a cardiologist.    She will arrange for a Pap smear. We ask for a colonoscopy with GI. She will be back to see us in one year, sooner if she has any problems. We encouraged her to communicate with us with [facility]. I have discussed the diagnosis with the patient and the intended plan as seen in the  Orders. The patient understands and agees with the plan.   The patient has   received an after visit summary and questions were answered concerning  future plans  Patient labs and/or xrays were reviewed  Past records were reviewed. PLAN:  .  Orders Placed This Encounter    TK MAMMO BI SCREENING INCL CAD    HEPATITIS C AB    URINALYSIS W/ RFLX MICROSCOPIC    CBC WITH AUTOMATED DIFF    METABOLIC PANEL, COMPREHENSIVE    LIPID PANEL    TSH 3RD GENERATION    SED RATE (ESR)    URIC ACID    REFERRAL TO OBSTETRICS AND GYNECOLOGY    Darrell Jensen [address] Card[facility] [address] Gastroenterology [facility]    ketorolac (TORADOL) 10 mg tablet    methylPREDNISolone (MEDROL DOSEPACK) 4 mg tablet       Follow-up and Dispositions    Return in about 1 year (around 2/20/2024). ATTENTION:   This medical record was transcribed using an electronic medical records system. Although proofread, it may and can contain electronic and spelling errors. Other human spelling and other errors may be present. Corrections may be executed at a later time. Please feel free to contact us for any clarifications as needed.

## 2023-02-20 NOTE — PROGRESS NOTES
Chief Complaint   Patient presents with    Complete Physical     Patient is here for a wellness visit. 1. Have you been to the ER, urgent care clinic since your last visit? Hospitalized since your last visit? Yes Reason for visit: fall and injury     2. Have you seen or consulted any other health care providers outside of the [address] [place] [address] since your last visit? Include any pap smears or colon screening.  Yes Where: [facility]

## 2023-02-20 NOTE — ED PROVIDER NOTES
52F w/ hx obesity and depression p/w 1wk back pain. Pt reports slip and fall while at work 1wk ago. Stepan Claude onto her left buttock. Having sharp, constant pain since then from her left back radiating to her buttock and down her leg w/ numbness and tingling. No sensory loss or weakness. Able to stand and walk but this makes pain worse. Was seen here after injury and had xrays that were unremarkable. Followed up w/ occupational health. Taking nsaids and medrol dose pack w/o improvement. No abd pain, N/V, F/C, cough, CP/SOB. Denies bowel/bladder incontinence, pelvic anesthesia, LE weakness/numbness, gait abnl, fevers, vomiting, hx of recent falls/trauma/injury, IVDA, cancer, prior back surgeries.          Past Medical History:   Diagnosis Date    Anxiety and depression     Back pain 2023    fall at work - work com    Preventative health care        Past Surgical History:   Procedure Laterality Date    HX  SECTION           Family History:   Problem Relation Age of Onset    Diabetes Mother     Cancer Mother         thyroid    Cancer Maternal Aunt         thyroid, lung cancer    Asthma Brother     Diabetes Brother     Arrhythmia Father     Heart defect Father     Heart Surgery Father         AICD -- sudden death       Social History     Socioeconomic History    Marital status:      Spouse name:     Number of children: 1    Years of education: Not on file    Highest education level: Not on file   Occupational History    Occupation: nurse--Cardiology   Tobacco Use    Smoking status: Never    Smokeless tobacco: Never   Vaping Use    Vaping Use: Never used   Substance and Sexual Activity    Alcohol use: Yes     Comment: occasionally    Drug use: No    Sexual activity: Yes     Partners: Male     Birth control/protection: None     Comment:  of 26 yrs   Other Topics Concern     Service Not Asked    Blood Transfusions Not Asked    Caffeine Concern Not Asked    Occupational Exposure Not Asked Hobby Hazards Not Asked    Sleep Concern Not Asked    Stress Concern Not Asked    Weight Concern Not Asked    Special Diet Not Asked    Back Care Not Asked    Exercise Not Asked     Comment: none    Bike Helmet Not Asked    Seat Belt Not Asked    Self-Exams Not Asked   Social History Narrative    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 Sabianist preferences Denominational        Family history: Father is [de-identified] with hypertension and cardiac disease, mother is [de-identified] with diabetes and lung cancer 1 sister is alive and well 49-year-old brother  complications of diabetes                 Social Determinants of Health     Financial Resource Strain: Not on file   Food Insecurity: Not on file   Transportation Needs: Not on file   Physical Activity: Not on file   Stress: Not on file   Social Connections: Not on file   Intimate Partner Violence: Not on file   Housing Stability: Not on file         ALLERGIES: Patient has no known allergies. Review of Systems   Constitutional:  Negative for chills, diaphoresis and fever. HENT:  Negative for facial swelling, mouth sores, nosebleeds, trouble swallowing and voice change. Eyes:  Negative for pain and visual disturbance. Respiratory:  Negative for apnea, cough, choking, shortness of breath, wheezing and stridor. Cardiovascular:  Negative for chest pain, palpitations and leg swelling. Gastrointestinal:  Negative for abdominal distention, abdominal pain, blood in stool, diarrhea, nausea and vomiting. Genitourinary:  Negative for difficulty urinating, dysuria, flank pain, hematuria and pelvic pain. Musculoskeletal:  Positive for back pain. Negative for joint swelling. Skin:  Negative for color change and rash. Allergic/Immunologic: Negative for immunocompromised state. Neurological:  Negative for dizziness, seizures, syncope, speech difficulty and light-headedness. Hematological:  Does not bruise/bleed easily. Psychiatric/Behavioral:  Negative for agitation and behavioral problems. There were no vitals filed for this visit. Physical Exam  Vitals and nursing note reviewed. Constitutional:       General: She is not in acute distress. Appearance: Normal appearance. She is not ill-appearing or toxic-appearing. HENT:      Head: Normocephalic and atraumatic. Right Ear: External ear normal.      Left Ear: External ear normal.      Nose: Nose normal.      Mouth/Throat:      Mouth: Mucous membranes are moist.      Pharynx: Oropharynx is clear. No oropharyngeal exudate or posterior oropharyngeal erythema. Eyes:      General: No scleral icterus. Extraocular Movements: Extraocular movements intact. Conjunctiva/sclera: Conjunctivae normal.      Pupils: Pupils are equal, round, and reactive to light. Cardiovascular:      Rate and Rhythm: Normal rate and regular rhythm. Pulses: Normal pulses. Heart sounds: Normal heart sounds. No murmur heard. No friction rub. No gallop. Pulmonary:      Effort: Pulmonary effort is normal. No respiratory distress. Breath sounds: Normal breath sounds. No stridor. No wheezing, rhonchi or rales. Abdominal:      General: There is no distension. Palpations: Abdomen is soft. Tenderness: There is no abdominal tenderness. There is no guarding or rebound. Musculoskeletal:         General: No tenderness or deformity. Normal range of motion. Cervical back: Normal range of motion and neck supple. No rigidity. Right lower leg: No edema. Left lower leg: No edema. Skin:     General: Skin is warm. Capillary Refill: Capillary refill takes less than 2 seconds.       Coloration: Skin is not jaundiced. Neurological:      General: No focal deficit present. Mental Status: She is alert. Cranial Nerves: No cranial nerve deficit. Sensory: No sensory deficit. Motor: No weakness. Coordination: Coordination normal.   Psychiatric:         Mood and Affect: Mood normal.         Behavior: Behavior normal.         Thought Content: Thought content normal.         Judgment: Judgment normal.        Medical Decision Making  52F w/ hx obesity and depression p/w 1wk left low back pain. Recently seen here and had negative xrays but continuing to have pain. Pt ambulatory around ED but uncomfortable. Hemodynamically stable w/o resp distress. BLE neurovasc intact. Pt verbally abusive (yelling and screaming) to staff including myself and very uncooperative (refusing to undress even after offered various pain medications). Ordered CT pelvis and lumbar spine to r/o trauma. Toradol and morphine for pain. Pt signed out to Dr Edin Santos pending results and reassessment. Amount and/or Complexity of Data Reviewed  Radiology: ordered. Risk  OTC drugs. Prescription drug management.            Procedures

## 2023-02-21 LAB
ALBUMIN SERPL-MCNC: 4 G/DL (ref 3.5–5)
ALBUMIN/GLOB SERPL: 1.1 (ref 1.1–2.2)
ALP SERPL-CCNC: 73 U/L (ref 45–117)
ALT SERPL-CCNC: 19 U/L (ref 12–78)
ANION GAP SERPL CALC-SCNC: 6 MMOL/L (ref 5–15)
APPEARANCE UR: CLEAR
AST SERPL-CCNC: 15 U/L (ref 15–37)
BASOPHILS # BLD: 0.1 K/UL (ref 0–0.1)
BASOPHILS NFR BLD: 1 % (ref 0–1)
BILIRUB SERPL-MCNC: 0.5 MG/DL (ref 0.2–1)
BILIRUB UR QL: NEGATIVE
BUN SERPL-MCNC: 14 MG/DL (ref 6–20)
BUN/CREAT SERPL: 15 (ref 12–20)
CALCIUM SERPL-MCNC: 10.4 MG/DL (ref 8.5–10.1)
CHLORIDE SERPL-SCNC: 105 MMOL/L (ref 97–108)
CHOLEST SERPL-MCNC: 187 MG/DL
CO2 SERPL-SCNC: 30 MMOL/L (ref 21–32)
COLOR UR: NORMAL
CREAT SERPL-MCNC: 0.94 MG/DL (ref 0.55–1.02)
DIFFERENTIAL METHOD BLD: ABNORMAL
EOSINOPHIL # BLD: 0.2 K/UL (ref 0–0.4)
EOSINOPHIL NFR BLD: 2 % (ref 0–7)
ERYTHROCYTE [DISTWIDTH] IN BLOOD BY AUTOMATED COUNT: 13.9 % (ref 11.5–14.5)
ERYTHROCYTE [SEDIMENTATION RATE] IN BLOOD: 6 MM/HR (ref 0–30)
GLOBULIN SER CALC-MCNC: 3.5 G/DL (ref 2–4)
GLUCOSE SERPL-MCNC: 88 MG/DL (ref 65–100)
GLUCOSE UR STRIP.AUTO-MCNC: NEGATIVE MG/DL
HCT VFR BLD AUTO: 46.5 % (ref 35–47)
HCV AB SERPL QL IA: NONREACTIVE
HDLC SERPL-MCNC: 75 MG/DL
HDLC SERPL: 2.5 (ref 0–5)
HGB BLD-MCNC: 14.4 G/DL (ref 11.5–16)
HGB UR QL STRIP: NEGATIVE
IMM GRANULOCYTES # BLD AUTO: 0 K/UL (ref 0–0.04)
IMM GRANULOCYTES NFR BLD AUTO: 0 % (ref 0–0.5)
KETONES UR QL STRIP.AUTO: NEGATIVE MG/DL
LDLC SERPL CALC-MCNC: 88.6 MG/DL (ref 0–100)
LEUKOCYTE ESTERASE UR QL STRIP.AUTO: NEGATIVE
LYMPHOCYTES # BLD: 3.9 K/UL (ref 0.8–3.5)
LYMPHOCYTES NFR BLD: 32 % (ref 12–49)
MCH RBC QN AUTO: 27.7 PG (ref 26–34)
MCHC RBC AUTO-ENTMCNC: 31 G/DL (ref 30–36.5)
MCV RBC AUTO: 89.6 FL (ref 80–99)
MONOCYTES # BLD: 0.7 K/UL (ref 0–1)
MONOCYTES NFR BLD: 6 % (ref 5–13)
NEUTS SEG # BLD: 7.3 K/UL (ref 1.8–8)
NEUTS SEG NFR BLD: 59 % (ref 32–75)
NITRITE UR QL STRIP.AUTO: NEGATIVE
NRBC # BLD: 0 K/UL (ref 0–0.01)
NRBC BLD-RTO: 0 PER 100 WBC
PH UR STRIP: 5.5 (ref 5–8)
PLATELET # BLD AUTO: 716 K/UL (ref 150–400)
PLATELET COMMENTS,PCOM: ABNORMAL
PMV BLD AUTO: 9.6 FL (ref 8.9–12.9)
POTASSIUM SERPL-SCNC: 4.5 MMOL/L (ref 3.5–5.1)
PROT SERPL-MCNC: 7.5 G/DL (ref 6.4–8.2)
PROT UR STRIP-MCNC: NEGATIVE MG/DL
RBC # BLD AUTO: 5.19 M/UL (ref 3.8–5.2)
RBC MORPH BLD: ABNORMAL
SODIUM SERPL-SCNC: 141 MMOL/L (ref 136–145)
SP GR UR REFRACTOMETRY: 1.02 (ref 1–1.03)
TRIGL SERPL-MCNC: 117 MG/DL (ref ?–150)
TSH SERPL DL<=0.05 MIU/L-ACNC: 0.95 UIU/ML (ref 0.36–3.74)
UR CULT HOLD, URHOLD: NORMAL
URATE SERPL-MCNC: 4.6 MG/DL (ref 2.6–6)
UROBILINOGEN UR QL STRIP.AUTO: 0.2 EU/DL (ref 0.2–1)
VLDLC SERPL CALC-MCNC: 23.4 MG/DL
WBC # BLD AUTO: 12.2 K/UL (ref 3.6–11)

## 2023-03-20 ENCOUNTER — HOSPITAL ENCOUNTER (OUTPATIENT)
Dept: PHYSICAL THERAPY | Age: 53
Discharge: HOME | End: 2023-03-20
Payer: OTHER MISCELLANEOUS

## 2023-03-20 PROCEDURE — 97162 PT EVAL MOD COMPLEX 30 MIN: CPT | Performed by: PHYSICAL THERAPIST

## 2023-03-20 PROCEDURE — 97110 THERAPEUTIC EXERCISES: CPT | Performed by: PHYSICAL THERAPIST

## 2023-03-20 NOTE — PROGRESS NOTES
PT INITIAL EVALUATION NOTE 2-15    Patient Name: Darren Berkowitz  Date:3/20/2023  : 1970  [x]  Patient  Verified  Payor: Precious Pallas / Plan: JIMGISELLE AGUAYO [facility] [address] / Product Type: PPO /    In time:1:30 PM  Out time:2:30 PM  Total Treatment Time (min): 60  Visit #: 1     Treatment Area: Left hip pain [M25.552]  Other low back pain [M54.59]    SUBJECTIVE  Pain Level (0-10 scale): 10/10  Any medication changes, allergies to medications, adverse drug reactions, diagnosis change, or new procedure performed?: [] No    [x] Yes (see summary sheet for update)  Subjective:     Low back, L hip pain  PLOF: No limitations with walking, bending forward  Mechanism of Injury: Patient was at work and tripped on a crate on 23. She fell forwards into a split and then back onto her tailbone. She sustained a back strain and L hamstring strain at that moment and went to the ED. X-rays revealed some degenerative changes and she was referred to her PCP. Previous Treatment/Compliance: Patient was seen by Dr. Jazz Meza, orthopedist, who performed an HENNY and referred her to PT. The injections has not appeared to have done much. 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 This case is WC and she has return to work date set for late April.   Living Situation: lives in a two story home with family  Pt Goals: to reduce pain and improve mobility  Barriers: severity  Motivation: motivated  Substance use: none   Cognition: A & O x 4        OBJECTIVE/EXAMINATION  Posture:  R lateral trunk lean  Other Observations:  Patient sits with majority weightbearing through the R pelvis, unable to shift onto L  Gait and Functional Mobility:    Gait: Slow speed, antalgic, L foot externally rotated  Transfers: forward trunk lean, B UE support required, slow speed  Bed mobility: painful with all movements, especially sit->supine  Palpation: TTP along L lower lumbar paraspinals, L glut, L proximal hamstring  Joint Mobility:NT  Lumbar AROM: Patient refused all motions except forward flexion. \"If I can't bend forward, how can I move in other directions? \"       R  L  Flexion    36\" from floor, unable to reach knees        Extension           Side Bending           Rotation             Aberrant movements:  Painful arc: [x] Yes  [] No  Instability catch: [x] Yes  [] No  Difficulty returning from flexion: [x] Yes  [] No  Reversal of lumbopelvic rhythm: [] Yes  [x] No                MMT:  All lumbar myotomes: >4/5  Neurological: Sensation: Intact  Special Tests:        Slump:Positive      Unable to assume supine positioning due to pain    Modality rationale: decrease edema, decrease inflammation, decrease pain, increase tissue extensibility, and increase muscle contraction/control to improve the patients ability to bend forward without pain   Min Type Additional Details    [] Estim: []Att   []Unatt        []TENS instruct                  []IFC  []Premod   []NMES                     []Other:  []w/US   []w/ice   []w/heat  Position:  Location:    []  Traction: [] Cervical       []Lumbar                       [] Prone          []Supine                       []Intermittent   []Continuous Lbs:  [] before manual  [] after manual  []w/heat    []  Ultrasound: []Continuous   [] Pulsed at:                            []1MHz   []3MHz Location:  W/cm2:    []  Paraffin         Location:  []w/heat   15 [x]  Ice     [x]  Heat  []  Ice massage Position:seated  Location:low back/glut    []  Laser  []  Other: Position:  Location:    []  Vasopneumatic Device Pressure:       [] lo [] med [] hi   Temperature:    [x] Skin assessment post-treatment:  [x]intact []redness- no adverse reaction    []redness - adverse reaction:   Initially attempted MH, patient was unable to continue past 5 minutes due to a spasm in her L LE.  Then attempt ice, which was tolerated better and patient was able to sit for 10 minutes    15 min Therapeutic Exercise: [x] See flow sheet :   Rationale: increase ROM, increase strength, and improve coordination to improve the patients ability to sit for prolonged periods without pain          With   [] TE   [] TA   [] Neuro   [] SC   [] other: Patient Education: [x] Review HEP    [] Progressed/Changed HEP based on:   [] positioning   [] body mechanics   [] transfers   [] heat/ice application    [] other:        Other Objective/Functional Measures: FOTO Functional Measure: 23/100                  Pain Level (0-10 scale) post treatment: 10      ASSESSMENT:      [x]  See Plan of Henrry Perez, PT 3/20/2023

## 2023-03-20 NOTE — THERAPY EVALUATION
Physical Therapy at [facility],   a part of  [facility]  [place]  [facility] Lele Quintero  Phone: [phone]  Fax: [phone]    Plan of Care/ Statement of Necessity for Physical Therapy Services 2-15    Patient name: Rain Ortiz  : 1970  Provider#: [id number]  Referral source: Danielle Shanks MD      Medical/Treatment Diagnosis: Left hip pain [M25.552]  Other low back pain [M54.59]     Prior Hospitalization: see medical history     Comorbidities: Obesity  Prior Level of Function: see initial eval  Medications: Verified on Patient Summary List    Start of Care: 3/20/2023      Onset Date: 2023       The Plan of Care and following information is based on the information from the initial evaluation. Assessment/ key information: Patient presents with subacute L low back, L hamstring pain following a fall at work last month. She has significant loss of ROM along with functional limitations with prolonged standing and walking. She should progress well with PT and return to work over the next 6 weeks, but may expect slower progress initially due to high pain. Evaluation Complexity History MEDIUM  Complexity : 1-2 comorbidities / personal factors will impact the outcome/ POC ; Examination MEDIUM Complexity : 3 Standardized tests and measures addressing body structure, function, activity limitation and / or participation in recreation  ;Presentation MEDIUM Complexity : Evolving with changing characteristics  ; Clinical Decision Making MEDIUM Complexity : FOTO score of 26-74  Overall Complexity Rating: MEDIUM    Problem List: pain affecting function, decrease ROM, decrease strength, impaired gait/ balance, decrease ADL/ functional abilitiies, decrease activity tolerance, decrease flexibility/ joint mobility, and decrease transfer abilities   Treatment Plan may include any combination of the following: Therapeutic exercise, Neuromuscular reeducation, Manual therapy, Therapeutic activity, Self care/home management, Electric stim unattended , Vasopneumatic device, Gait training, Mechanical traction, and Needle insertion w/o injection (1 or 2 muscles)  Patient / Family readiness to learn indicated by: asking questions, trying to perform skills, and interest  Persons(s) to be included in education: patient (P)  Barriers to Learning/Limitations: None  Patient Goal (s): I want to be able to walk without pain.   Patient Self Reported Health Status: excellent  Rehabilitation Potential: excellent    Short Term Goals: To be accomplished in 4 weeks:  Patient will be able to walk two blocks with <5/10 low back and L LE pain. Patient will be able to stand 5 minutes without requiring a seated rest break due to pain. Patient will be able to bend forward and tie her shoes with <5/10 low back and L LE pain. Long Term Goals: To be accomplished in 12 weeks:  Patient will be able to walk 1/4 mile with <3/10 low back and L LE pain. Patient will be able to stand for 20 minutes without requiring a seated rest break due to pain. Patient will be able to  a 20# box from the floor with <3/10 low back pain. Frequency / Duration: Patient to be seen 2 times per week for 12 weeks. Patient/ Caregiver education and instruction: self care, activity modification, and exercises    [x]  Plan of care has been reviewed with SONY Galdamez, PT 3/20/2023     ________________________________________________________________________    I certify that the above Therapy Services are being furnished while the patient is under my care. I agree with the treatment plan and certify that this therapy is necessary.     Physician's Signature:____________________  Date:____________Time: _________      Nathanael Powell MD

## 2023-03-23 ENCOUNTER — HOSPITAL ENCOUNTER (OUTPATIENT)
Dept: PHYSICAL THERAPY | Age: 53
Discharge: HOME | End: 2023-03-23
Payer: OTHER MISCELLANEOUS

## 2023-03-23 PROCEDURE — 97110 THERAPEUTIC EXERCISES: CPT | Performed by: PHYSICAL THERAPIST

## 2023-03-23 NOTE — PROGRESS NOTES
PT DAILY TREATMENT NOTE 2-15    Patient Name: Aicha Samaniego  Date:3/23/2023  : 1970  [x]  Patient  Verified  Payor: Jarad Syed / Plan: [id number] / Product Type: Workers Comp /    In time: 1:00 PM  Out time: 2:00 PM  Total Treatment Time (min): 60  Visit #: 2     Treatment Area: Left hip pain [M25.552]  Other low back pain [M54.59]    SUBJECTIVE  Pain Level (0-10 scale): 810  Any medication changes, allergies to medications, adverse drug reactions, diagnosis change, or new procedure performed?: [x] No    [] Yes (see summary sheet for update)  Subjective functional status/changes:   [] No changes reported  Patient reports performing the seated portion of her HEP daily along with rotating heat and ice. She feels slightly better at the low back/glut area, however she feels like she guarding with her whole body.     OBJECTIVE            Modality rationale: decrease edema, decrease inflammation, decrease pain, increase tissue extensibility, and increase muscle contraction/control to improve the patients ability to bend forward without pain    Min Type Additional Details     [] Estim: []Att   []Unatt        []TENS instruct                  []IFC  []Premod   []NMES                     []Other:  []w/US   []w/ice   []w/heat  Position:  Location:     []  Traction: [] Cervical       []Lumbar                       [] Prone          []Supine                       []Intermittent   []Continuous Lbs:  [] before manual  [] after manual  []w/heat     []  Ultrasound: []Continuous   [] Pulsed at:                            []1MHz   []3MHz Location:  W/cm2:     []  Paraffin         Location:  []w/heat   15 [x]  Ice     [x]  Heat  []  Ice massage Position:seated  Location:low back/glut     []  Laser  []  Other: Position:  Location:     []  Vasopneumatic Device Pressure:       [] lo [] med [] hi   Temperature:    [x] Skin assessment post-treatment:  [x]intact []redness- no adverse reaction    []redness - adverse reaction:   Heat was performed first for 5 minutes with patient performing gentle seated spinal ROM exercises. Ice was performed afterwards along with verbal cueing for diaphragmatic breathing and periodic B posterior mediastinum expansion. Patient pushed into a raised mat, sometimes alternating R and L and sometimes at the same time. Patient reported that ice was more effective. 15 min Therapeutic Exercise:  [x] See flow sheet :   Rationale: increase ROM, increase strength, and improve coordination to improve the patients ability to sit for prolonged periods without pain                                                                 With   [] TE   [] TA   [] Neuro   [] SC   [] other: Patient Education: [x] Review HEP    [] Progressed/Changed HEP based on:   [] positioning   [] body mechanics   [] transfers   [] heat/ice application    [] other:      Other Objective/Functional Measures:   Very stiff with ambulation and transfers, little to no trunk rotation     Pain Level (0-10 scale) post treatment: 7/10, \"a little better\"    ASSESSMENT/Changes in Function:   No significant change yet in functional status. Patient will continue to benefit from skilled PT services to modify and progress therapeutic interventions, address functional mobility deficits, address ROM deficits, address strength deficits, analyze and address soft tissue restrictions, analyze and cue movement patterns, analyze and modify body mechanics/ergonomics, and assess and modify postural abnormalities to attain remaining goals. []  See Plan of Care  []  See progress note/recertification  []  See Discharge Summary         Progress towards goals / Updated goals:  No significant improvement towards short term goals, but ther ex was tolerated well.     PLAN  [x]  Upgrade activities as tolerated     [x]  Continue plan of care  []  Update interventions per flow sheet       []  Discharge due to:_  []  Other:_ Paolo Steen, PT 3/23/2023

## 2023-03-29 ENCOUNTER — HOSPITAL ENCOUNTER (OUTPATIENT)
Dept: PHYSICAL THERAPY | Age: 53
Discharge: HOME | End: 2023-03-29
Payer: OTHER MISCELLANEOUS

## 2023-03-29 PROCEDURE — 97110 THERAPEUTIC EXERCISES: CPT | Performed by: PHYSICAL THERAPIST

## 2023-03-29 NOTE — PROGRESS NOTES
PT DAILY TREATMENT NOTE 2-15    Patient Name: Ap Bauer  Date:3/29/2023  : 1970  [x]  Patient  Verified  Payor: Geovannyloretta Galo / Plan: [id number] / Product Type: Workers Comp /    In time: 8:45 AM Out time: 9:45 AM  Total Treatment Time (min): 60  Visit #: 3     Treatment Area: Left hip pain [M25.552]  Other low back pain [M54.59]    SUBJECTIVE  Pain Level (0-10 scale): 8/10  Any medication changes, allergies to medications, adverse drug reactions, diagnosis change, or new procedure performed?: [x] No    [] Yes (see summary sheet for update)  Subjective functional status/changes:   [] No changes reported  Patient still has a very high level of pain at her lower back, which she is frustrated about. \"I had the injection two weeks ago and I should be better by now. \" Patient reports that she has been crying less and feels like she has a better control of her breathing since reviewing diaphragmatic breathing last week.     OBJECTIVE            Modality rationale: decrease edema, decrease inflammation, decrease pain, increase tissue extensibility, and increase muscle contraction/control to improve the patients ability to bend forward without pain    Min Type Additional Details     [] Estim: []Att   []Unatt        []TENS instruct                  []IFC  []Premod   []NMES                     []Other:  []w/US   []w/ice   []w/heat  Position:  Location:     []  Traction: [] Cervical       []Lumbar                       [] Prone          []Supine                       []Intermittent   []Continuous Lbs:  [] before manual  [] after manual  []w/heat     []  Ultrasound: []Continuous   [] Pulsed at:                            []1MHz   []3MHz Location:  W/cm2:     []  Paraffin         Location:  []w/heat   15 [x]  Ice     []  Heat  []  Ice massage Position:seated  Location:low back/glut     []  Laser  []  Other: Position:  Location:     []  Vasopneumatic Device Pressure:       [] lo [] med [] hi   Temperature:    [x] Skin assessment post-treatment:  [x]intact []redness- no adverse reaction    []redness - adverse reaction:     40 min Therapeutic Exercise:  [x] See flow sheet :   Rationale: increase ROM, increase strength, and improve coordination to improve the patients ability to sit for prolonged periods without pain                                                                 With   [] TE   [] TA   [] Neuro   [] SC   [] other: Patient Education: [x] Review HEP    [] Progressed/Changed HEP based on:   [] positioning   [] body mechanics   [] transfers   [] heat/ice application    [] other:      Other Objective/Functional Measures:   Continues to have a very protective/high pain state posture with increased lumbar lordosis, forward weight shift  Frequent verbal cueing to have patient achieve ZoA and heels in contact with floor    Pain Level (0-10 scale) post treatment: 7/10    ASSESSMENT/Changes in Function:   No significant change yet in functional status. Patient will continue to benefit from skilled PT services to modify and progress therapeutic interventions, address functional mobility deficits, address ROM deficits, address strength deficits, analyze and address soft tissue restrictions, analyze and cue movement patterns, analyze and modify body mechanics/ergonomics, and assess and modify postural abnormalities to attain remaining goals. []  See Plan of Care  []  See progress note/recertification  []  See Discharge Summary         Progress towards goals / Updated goals:  Continued very slow overall progress towards short term goals due to an elevated pain state.     PLAN  [x]  Upgrade activities as tolerated     [x]  Continue plan of care  []  Update interventions per flow sheet       []  Discharge due to:_  []  Other:_      Paolo Old, PT 3/29/2023

## 2023-04-03 ENCOUNTER — TRANSCRIBE ORDER (OUTPATIENT)
Dept: SCHEDULING | Age: 53
End: 2023-04-03

## 2023-04-03 DIAGNOSIS — M43.16 SPONDYLOLISTHESIS OF LUMBAR REGION: Primary | ICD-10-CM

## 2023-04-03 DIAGNOSIS — M48.062 SPINAL STENOSIS OF LUMBAR REGION WITH NEUROGENIC CLAUDICATION: ICD-10-CM

## 2023-04-04 ENCOUNTER — HOSPITAL ENCOUNTER (OUTPATIENT)
Dept: PHYSICAL THERAPY | Age: 53
End: 2023-04-04
Payer: OTHER MISCELLANEOUS

## 2023-04-04 PROCEDURE — 97110 THERAPEUTIC EXERCISES: CPT | Performed by: PHYSICAL THERAPIST

## 2023-04-04 PROCEDURE — 97016 VASOPNEUMATIC DEVICE THERAPY: CPT | Performed by: PHYSICAL THERAPIST

## 2023-04-04 NOTE — PROGRESS NOTES
PT DAILY TREATMENT NOTE 2-15    Patient Name: Lo Velazquez  Date:2023  : 1970  [x]  Patient  Verified  Payor: Jose Martin Mcfadden / Plan: [id number] / Product Type: Workers Comp /    In time: 1:05 PM Out time: 2:00 PM  Total Treatment Time (min): 55  Visit #: 4    Treatment Area: Left hip pain [M25.552]  Other low back pain [M54.59]    SUBJECTIVE  Pain Level (0-10 scale): 610  Any medication changes, allergies to medications, adverse drug reactions, diagnosis change, or new procedure performed?: [x] No    [] Yes (see summary sheet for update)  Subjective functional status/changes:   [] No changes reported  Patient reports that she practiced the weightshifting exercise all through the weekend and is feeling much better.     OBJECTIVE            Modality rationale: decrease edema, decrease inflammation, decrease pain, increase tissue extensibility, and increase muscle contraction/control to improve the patients ability to bend forward without pain    Min Type Additional Details     [] Estim: []Att   []Unatt        []TENS instruct                  []IFC  []Premod   []NMES                     []Other:  []w/US   []w/ice   []w/heat  Position:  Location:     []  Traction: [] Cervical       []Lumbar                       [] Prone          []Supine                       []Intermittent   []Continuous Lbs:  [] before manual  [] after manual  []w/heat     []  Ultrasound: []Continuous   [] Pulsed at:                            []1MHz   []3MHz Location:  W/cm2:     []  Paraffin         Location:  []w/heat    []  Ice     []  Heat  []  Ice massage Position:seated  Location:low back/glut     []  Laser  []  Other: Position:  Location:   15  [x]  Vasopneumatic Device Pressure:       [] lo [] med [] hi   Temperature:    [x] Skin assessment post-treatment:  [x]intact []redness- no adverse reaction    []redness - adverse reaction:     40 min Therapeutic Exercise:  [x] See flow sheet : Rationale: increase ROM, increase strength, and improve coordination to improve the patients ability to sit for prolonged periods without pain                                                                 With   [] TE   [] TA   [] Neuro   [] SC   [] other: Patient Education: [x] Review HEP    [] Progressed/Changed HEP based on:   [] positioning   [] body mechanics   [] transfers   [] heat/ice application    [] other:      Other Objective/Functional Measures:   Max difficulty with sequencing with walking sticks due to muscle guarding through the trunk     Pain Level (0-10 scale) post treatment: 7/10    ASSESSMENT/Changes in Function:   Improved overall pain reporting since starting PT, however continue muscle guarding. Patient will continue to benefit from skilled PT services to modify and progress therapeutic interventions, address functional mobility deficits, address ROM deficits, address strength deficits, analyze and address soft tissue restrictions, analyze and cue movement patterns, analyze and modify body mechanics/ergonomics, and assess and modify postural abnormalities to attain remaining goals. []  See Plan of Care  []  See progress note/recertification  []  See Discharge Summary         Progress towards goals / Updated goals:  Improved tolerance to ther ex on today's visit compared to last week. Will continue to emphasize pain science concepts with ZoA/diaphragmatic breathing to allow for greater arm swing during gait.     PLAN  [x]  Upgrade activities as tolerated     [x]  Continue plan of care  []  Update interventions per flow sheet       []  Discharge due to:_  []  Other:_      Sameera Burdick, PT 4/4/2023

## 2023-04-17 ENCOUNTER — HOSPITAL ENCOUNTER (OUTPATIENT)
Dept: PHYSICAL THERAPY | Age: 53
Discharge: HOME | End: 2023-04-17
Payer: OTHER MISCELLANEOUS

## 2023-04-17 PROCEDURE — 97016 VASOPNEUMATIC DEVICE THERAPY: CPT | Performed by: PHYSICAL MEDICINE & REHABILITATION

## 2023-04-17 PROCEDURE — 97110 THERAPEUTIC EXERCISES: CPT | Performed by: PHYSICAL MEDICINE & REHABILITATION

## 2023-04-17 NOTE — PROGRESS NOTES
PT DAILY TREATMENT NOTE 2-15    Patient Name: Markus Akers  Date:2023  : 1970  [x]  Patient  Verified  Payor: Melissa Sullivan / Plan: [address] / Product Type: Workers Comp /    In time: 550 PM Out time: [phone] PM  Total Treatment Time (min): 55  Visit #: 5    Treatment Area: Left hip pain [M25.552]  Other low back pain [M54.59]    SUBJECTIVE  Pain Level (0-10 scale): 6/10  Any medication changes, allergies to medications, adverse drug reactions, diagnosis change, or new procedure performed?: [x] No    [] Yes (see summary sheet for update)  Subjective functional status/changes:   [] No changes reported  Patient reports that she continues to have pain all the time.     OBJECTIVE            Modality rationale: decrease edema, decrease inflammation, decrease pain, increase tissue extensibility, and increase muscle contraction/control to improve the patients ability to bend forward without pain    Min Type Additional Details     [] Estim: []Att   []Unatt        []TENS instruct                  []IFC  []Premod   []NMES                     []Other:  []w/US   []w/ice   []w/heat  Position:  Location:     []  Traction: [] Cervical       []Lumbar                       [] Prone          []Supine                       []Intermittent   []Continuous Lbs:  [] before manual  [] after manual  []w/heat     []  Ultrasound: []Continuous   [] Pulsed at:                            []1MHz   []3MHz Location:  W/cm2:     []  Paraffin         Location:  []w/heat    []  Ice     []  Heat  []  Ice massage Position:seated  Location:low back/glut     []  Laser  []  Other: Position:  Location:   15  [x]  Vasopneumatic Device seated Pressure:       [x] lo [] med [] hi   Temperature: 34   [x] Skin assessment post-treatment:  [x]intact []redness- no adverse reaction    []redness - adverse reaction:     40 min Therapeutic Exercise:  [x] See flow sheet :   Rationale: increase ROM, increase strength, and improve coordination to improve the patients ability to sit for prolonged periods without pain                                                                 With   [] TE   [] TA   [] Neuro   [] SC   [] other: Patient Education: [x] Review HEP    [] Progressed/Changed HEP based on:   [] positioning   [] body mechanics   [] transfers   [] heat/ice application    [] other:      Other Objective/Functional Measures:   Attempted to lay supine on table. Patient began to cry. When asked what she was feeling, it was a pull, no sharp pains. Tried laying there for a couple minutes focusing on breathing. Pain Level (0-10 scale) post treatment: 7/10    ASSESSMENT/Changes in Function:     Patient will continue to benefit from skilled PT services to modify and progress therapeutic interventions, address functional mobility deficits, address ROM deficits, address strength deficits, analyze and address soft tissue restrictions, analyze and cue movement patterns, analyze and modify body mechanics/ergonomics, and assess and modify postural abnormalities to attain remaining goals.      []  See Plan of Care  []  See progress note/recertification  []  See Discharge Summary         Progress towards goals / Updated goals:  MRI scheduled for Thursday    PLAN  [x]  Upgrade activities as tolerated     [x]  Continue plan of care  [x]  Update interventions per flow sheet       []  Discharge due to:_  []  Other:_      Erin Short PTA, OPTA, CPT  4/17/2023

## 2023-04-18 ENCOUNTER — APPOINTMENT (OUTPATIENT)
Dept: PHYSICAL THERAPY | Age: 53
End: 2023-04-18
Payer: OTHER MISCELLANEOUS

## 2023-04-20 ENCOUNTER — HOSPITAL ENCOUNTER (OUTPATIENT)
Dept: MRI IMAGING | Age: 53
Discharge: HOME | End: 2023-04-20
Attending: ORTHOPAEDIC SURGERY
Payer: OTHER MISCELLANEOUS

## 2023-04-20 DIAGNOSIS — M43.16 SPONDYLOLISTHESIS OF LUMBAR REGION: ICD-10-CM

## 2023-04-20 DIAGNOSIS — M48.062 SPINAL STENOSIS OF LUMBAR REGION WITH NEUROGENIC CLAUDICATION: ICD-10-CM

## 2023-04-20 PROCEDURE — 72148 MRI LUMBAR SPINE W/O DYE: CPT

## 2023-04-23 DIAGNOSIS — M48.062 SPINAL STENOSIS OF LUMBAR REGION WITH NEUROGENIC CLAUDICATION: ICD-10-CM

## 2023-04-23 DIAGNOSIS — M43.16 SPONDYLOLISTHESIS OF LUMBAR REGION: Primary | ICD-10-CM

## 2023-04-25 ENCOUNTER — HOSPITAL ENCOUNTER (OUTPATIENT)
Dept: PHYSICAL THERAPY | Age: 53
Discharge: HOME | End: 2023-04-25
Payer: OTHER MISCELLANEOUS

## 2023-04-25 PROCEDURE — 97110 THERAPEUTIC EXERCISES: CPT | Performed by: PHYSICAL THERAPIST

## 2023-04-25 PROCEDURE — 97016 VASOPNEUMATIC DEVICE THERAPY: CPT | Performed by: PHYSICAL THERAPIST

## 2023-04-25 NOTE — PROGRESS NOTES
Physical Therapy at [facility],   a part of  [facility]  [address] [facility], [address] [place] [address] Drive  Phone: [phone]      Fax:  [phone]    Progress Note    Name: Cassandra Alfaro   : 1970   MD: Lissette Snowden MD       Treatment Diagnosis: Left hip pain [M25.552]  Other low back pain [M54.59]  Start of Care: 3/20/23    Visits from Start of Care: 7  Missed Visits: 0    Summary of Care: Therapy has focused on a very gradual progression of therapeutic exercises to allow for greater ROM through the spine and L hip. She has been very limited in this progress due to high pain and fear of movement. She had an MRI recently and is awaiting results and further guidance from her orthopedist.    Assessment / Recommendations:   Despite multiple attempts to explain pain science concepts and the importance of movement, Mrs. Alexa Tiwari continues to be very hesitant to move through the L LE. Her gait has improved with some gait training concepts, but the plan of care has been limited in scope due to the patient's apprehension about making her condition worse. Her pain level ranges from a 6-10/10 and there are very few interventions here in the clinic that do not increase her pain. She requires frequent motivation to complete sets or repetitions and has become emotional about her current state frequently. Short Term Goals: To be accomplished in 4 weeks:  Patient will be able to walk two blocks with <5/10 low back and L LE pain. Patient will be able to stand 5 minutes without requiring a seated rest break due to pain. Patient will be able to bend forward and tie her shoes with <5/10 low back and L LE pain. Today, I recommended that Mrs. Davis reach out to her orthopedic office to review the recent MRI. We will likely be limited moving forward until the patient is reassured about a relatively negative MRI.  The patient will continue to benefit from PT at 2x/week for an additional 6 weeks to allow for the achievement of all long term goals. PT will focus on a gradual progression of therapeutic exercises to allow for a safe return to work at her prior level of function.       Sherrie Beltrán, PT 4/25/2023

## 2023-04-25 NOTE — PROGRESS NOTES
PT DAILY TREATMENT NOTE 2-15    Patient Name: Lo Velazquez  Date:2023  : 1970  [x]  Patient  Verified  Payor: Jose Martin Mcfadden / Plan: [address] / Product Type: Workers Comp /    In time: 8:45 AM Out time:9:40 AM  Total Treatment Time (min): 55  Visit #: 7    Treatment Area: Left hip pain [M25.552]  Other low back pain [M54.59]    SUBJECTIVE  Pain Level (0-10 scale): 6/10  Any medication changes, allergies to medications, adverse drug reactions, diagnosis change, or new procedure performed?: [x] No    [] Yes (see summary sheet for update)  Subjective functional status/changes:   [] No changes reported  Patient reports that she is feeling slightly better overall and has been performing her HEP daily. She is had an MRI performed on Friday and is awaiting the results.     OBJECTIVE            Modality rationale: decrease edema, decrease inflammation, decrease pain, increase tissue extensibility, and increase muscle contraction/control to improve the patients ability to bend forward without pain    Min Type Additional Details     [] Estim: []Att   []Unatt        []TENS instruct                  []IFC  []Premod   []NMES                     []Other:  []w/US   []w/ice   []w/heat  Position:  Location:     []  Traction: [] Cervical       []Lumbar                       [] Prone          []Supine                       []Intermittent   []Continuous Lbs:  [] before manual  [] after manual  []w/heat     []  Ultrasound: []Continuous   [] Pulsed at:                            []1MHz   []3MHz Location:  W/cm2:     []  Paraffin         Location:  []w/heat    []  Ice     []  Heat  []  Ice massage Position:seated  Location:low back/glut     []  Laser  []  Other: Position:  Location:   15  [x]  Vasopneumatic Device seated Pressure:       [x] lo [] med [] hi   Temperature: 34   [x] Skin assessment post-treatment:  [x]intact []redness- no adverse reaction    []redness - adverse reaction:     40 min Therapeutic Exercise:  [x] See flow sheet :   Rationale: increase ROM, increase strength, and improve coordination to improve the patients ability to sit for prolonged periods without pain                                                                 With   [] TE   [] TA   [] Neuro   [] SC   [] other: Patient Education: [x] Review HEP    [] Progressed/Changed HEP based on:   [] positioning   [] body mechanics   [] transfers   [] heat/ice application    [] other:      Other Objective/Functional Measures:   Patient continues to be very hesitant to move through the L LE, particularly the L hip  She reports that primary reasoning is fear of pain    Pain Level (0-10 scale) post treatment: 7/10    ASSESSMENT/Changes in Function:   While patient is able to tolerate standing ther ex and with a higher volume of exercises, compared to one month ago, she continue to be limited by high pain. Patient will continue to benefit from skilled PT services to modify and progress therapeutic interventions, address functional mobility deficits, address ROM deficits, address strength deficits, analyze and address soft tissue restrictions, analyze and cue movement patterns, analyze and modify body mechanics/ergonomics, and assess and modify postural abnormalities to attain remaining goals. []  See Plan of Care  [x]  See progress note/recertification  []  See Discharge Summary           PLAN  [x]  Upgrade activities as tolerated     [x]  Continue plan of care  [x]  Update interventions per flow sheet       []  Discharge due to:_  []  Other:_      Kendra Solorzano, PT , DPT, OCS, Cert.  DN   4/25/2023

## 2023-05-01 ENCOUNTER — APPOINTMENT (OUTPATIENT)
Facility: HOSPITAL | Age: 53
End: 2023-05-01
Payer: COMMERCIAL

## 2023-05-01 ENCOUNTER — HOSPITAL ENCOUNTER (OUTPATIENT)
Dept: PHYSICAL THERAPY | Age: 53
Discharge: HOME | End: 2023-05-01
Payer: OTHER MISCELLANEOUS

## 2023-05-01 PROCEDURE — 97110 THERAPEUTIC EXERCISES: CPT

## 2023-05-01 PROCEDURE — 97016 VASOPNEUMATIC DEVICE THERAPY: CPT

## 2023-05-01 PROCEDURE — 97016 VASOPNEUMATIC DEVICE THERAPY: CPT | Performed by: PHYSICAL THERAPIST

## 2023-05-01 PROCEDURE — 9990 CHARGE CONVERSION

## 2023-05-01 PROCEDURE — 97110 THERAPEUTIC EXERCISES: CPT | Performed by: PHYSICAL THERAPIST

## 2023-05-01 NOTE — PROGRESS NOTES
PT DAILY TREATMENT NOTE 2-15    Patient Name: Sandra Pepe  Date:2023  : 1970  [x]  Patient  Verified  Payor: James Mendiola / Plan: [id number] / Product Type: Workers Comp /    In time: 9:30 AM Out time:10:25 AM  Total Treatment Time (min): 55  Visit #: 8    Treatment Area: Left hip pain [M25.552]  Other low back pain [M54.59]    SUBJECTIVE  Pain Level (0-10 scale): 6/10  Any medication changes, allergies to medications, adverse drug reactions, diagnosis change, or new procedure performed?: [x] No    [] Yes (see summary sheet for update)  Subjective functional status/changes:   [] No changes reported  The MRI revealed some degenerative changes at her l/s and she is unsure about whether she will have a 2nd injection. She is doing slightly better, but still has limited tolerance to walking.     OBJECTIVE            Modality rationale: decrease edema, decrease inflammation, decrease pain, increase tissue extensibility, and increase muscle contraction/control to improve the patients ability to bend forward without pain    Min Type Additional Details     [] Estim: []Att   []Unatt        []TENS instruct                  []IFC  []Premod   []NMES                     []Other:  []w/US   []w/ice   []w/heat  Position:  Location:     []  Traction: [] Cervical       []Lumbar                       [] Prone          []Supine                       []Intermittent   []Continuous Lbs:  [] before manual  [] after manual  []w/heat     []  Ultrasound: []Continuous   [] Pulsed at:                            []1MHz   []3MHz Location:  W/cm2:     []  Paraffin         Location:  []w/heat    []  Ice     []  Heat  []  Ice massage Position:seated  Location:low back/glut     []  Laser  []  Other: Position:  Location:   15  [x]  Vasopneumatic Device seated Pressure:       [x] lo [] med [] hi   Temperature: 34   [x] Skin assessment post-treatment:  [x]intact []redness- no adverse reaction []redness - adverse reaction:     40 min Therapeutic Exercise:  [x] See flow sheet :   Rationale: increase ROM, increase strength, and improve coordination to improve the patients ability to sit for prolonged periods without pain                                                                 With   [] TE   [] TA   [] Neuro   [] SC   [] other: Patient Education: [x] Review HEP    [] Progressed/Changed HEP based on:   [] positioning   [] body mechanics   [] transfers   [] heat/ice application    [] other:      Other Objective/Functional Measures:   Improved confidence with elevating the L LE with standing ther ex  Difficulty sequencing R versus L    Pain Level (0-10 scale) post treatment: 7/10    ASSESSMENT/Changes in Function:   Slow overall progress with ther ex program, but today's session was tolerated better than previous visit. Patient will continue to benefit from skilled PT services to modify and progress therapeutic interventions, address functional mobility deficits, address ROM deficits, address strength deficits, analyze and address soft tissue restrictions, analyze and cue movement patterns, analyze and modify body mechanics/ergonomics, and assess and modify postural abnormalities to attain remaining goals. []  See Plan of Care  []  See progress note/recertification  []  See Discharge Summary       Progress towards goals:  Patient can now perform a sit-to-stand from a normal height chair with B UE support, but no significant increase in LBP. PLAN  [x]  Upgrade activities as tolerated     [x]  Continue plan of care  [x]  Update interventions per flow sheet       []  Discharge due to:_  []  Other:_      Fabián Suresh, PT , DPT, OCS, Cert.  DN   5/1/2023

## 2023-05-12 ENCOUNTER — HOSPITAL ENCOUNTER (OUTPATIENT)
Facility: HOSPITAL | Age: 53
Setting detail: RECURRING SERIES
Discharge: HOME | End: 2023-05-15
Payer: COMMERCIAL

## 2023-05-12 PROCEDURE — 97110 THERAPEUTIC EXERCISES: CPT

## 2023-05-15 ENCOUNTER — HOSPITAL ENCOUNTER (OUTPATIENT)
Facility: HOSPITAL | Age: 53
Setting detail: RECURRING SERIES
Discharge: HOME | End: 2023-05-18
Payer: COMMERCIAL

## 2023-05-15 PROCEDURE — 97110 THERAPEUTIC EXERCISES: CPT

## 2023-05-15 NOTE — PROGRESS NOTES
PHYSICAL THERAPY - DAILY TREATMENT NOTE (updated 3/23)      Date: 5/15/2023          Patient Name:  Laurie Rowley :  1970   Medical   Diagnosis:  Pain in left hip [M25.552]  Other low back pain [M54.59] Treatment Diagnosis:  M54.42  LUMBAGO WITH SCIATICA, LEFT SIDE    Referral Source:  Evonne Brown MD Insurance:   Payor: [employer]IC [employer] / Plan: Leana Chavez [employer] [address] / Product Type: *No Product type* /                     Patient  verified yes     Visit #   Current  / Total 10 12   Time   In / Out 9:30 AM 10:25 AM   Total Treatment Time 55   Total Timed Codes 40         SUBJECTIVE    Pain Level (0-10 scale): 7/10    Any medication changes, allergies to medications, adverse drug reactions, diagnosis change, or new procedure performed?: [x] No    [] Yes (see summary sheet for update)  Medications: Verified on Patient Summary List    Subjective functional status/changes:     No significant functional changes. She is trying to walk regularly throughout the day, however the pain continues to be at a high level along the L SIJ, L LE.    OBJECTIVE      Therapeutic Procedures: Tx Min Billable or 1:1 Min (if diff from Tx Min) Procedure, Rationale, Specifics   40  85240 Therapeutic Exercise (timed):  increase ROM, strength, coordination, balance, and proprioception to improve patient's ability to progress to PLOF and address remaining functional goals. (see flow sheet as applicable)     Details if applicable:     40     Total Total         Modalities Rationale:     decrease inflammation and decrease pain to improve patient's ability to progress to PLOF and address remaining functional goals.        min [] Estim Unattended,             type/location:       []  w/ice    []  w/heat        min [] Estim Attended,             type/location:       []  w/ice   []  w/heat         []  w/US   []  TENS insruct            min []  Mechanical Traction,        type/lbs:        []  pro      []  sup           []  int       []  cont

## 2023-05-23 ENCOUNTER — HOSPITAL ENCOUNTER (OUTPATIENT)
Facility: HOSPITAL | Age: 53
Setting detail: RECURRING SERIES
Discharge: HOME | End: 2023-05-26
Payer: COMMERCIAL

## 2023-05-23 PROCEDURE — 97110 THERAPEUTIC EXERCISES: CPT

## 2023-05-23 NOTE — PROGRESS NOTES
PHYSICAL THERAPY - DAILY TREATMENT NOTE (updated 3/23)      Date: 2023          Patient Name:  Thu White :  1970   Medical   Diagnosis:  Pain in left hip [M25.552]  Other low back pain [M54.59] Treatment Diagnosis:  M54.42  LUMBAGO WITH SCIATICA, LEFT SIDE    Referral Source:  Caio Cook MD Insurance:   Payor: [employer]IC [employer] / Plan: Raudel Rodriguezon [address] / Product Type: *No Product type* /                     Patient  verified yes     Visit #   Current  / Total 11 12   Time   In / Out 8:45AM 10:25 AM   Total Treatment Time 55   Total Timed Codes 40         SUBJECTIVE    Pain Level (0-10 scale): 3/10    Any medication changes, allergies to medications, adverse drug reactions, diagnosis change, or new procedure performed?: [x] No    [] Yes (see summary sheet for update)  Medications: Verified on Patient Summary List    Subjective functional status/changes:     Patient is feeling better overall and has been performing her HEP regularly at home. She is scheduled for a lumbar fusion on  with Dr. Ann Noel, but she wants to discuss further options regarding pharmacology and holistic care. She was researching gabapentin and is wondering if its worth a trial run before committing to the surgery. OBJECTIVE      Therapeutic Procedures: Tx Min Billable or 1:1 Min (if diff from Tx Min) Procedure, Rationale, Specifics   40  96184 Therapeutic Exercise (timed):  increase ROM, strength, coordination, balance, and proprioception to improve patient's ability to progress to PLOF and address remaining functional goals. (see flow sheet as applicable)     Details if applicable:     40     Total Total         Modalities Rationale:     decrease inflammation and decrease pain to improve patient's ability to progress to PLOF and address remaining functional goals.        min [] Estim Unattended,             type/location:       []  w/ice    []  w/heat        min [] Estim Attended,             type/location:       []

## 2023-05-23 NOTE — PROGRESS NOTES
Physical Therapy at [facility],   a part of  [facility]  [address] [facility]iz  Phone: [phone]  Fax: [phone]  PHYSICAL THERAPY PROGRESS NOTE  Patient Name:  Briana Crum :  1970   Treatment/Medical Diagnosis: Pain in left hip [M25.552]  Other low back pain [M54.59]   Referral Source:  Sharon Siu MD     Date of Initial Visit:  3/20/23 Attended Visits:  11 Missed Visits:  0             Assessment / Recommendations:   Despite multiple attempts to explain pain science concepts and the importance of movement, Mrs. Roseline Marcos continues to be very hesitant to move through the L LE. Her gait has improved with some gait training concepts, but the plan of care has been limited in scope due to the patient's apprehension about making her condition worse. Her pain level ranges from a 6-10/10 and there are very few interventions here in the clinic that do not increase her pain. She requires frequent motivation to complete sets or repetitions and has become emotional about her current state frequently. SUMMARY OF TREATMENT/ASSESSMENT:  The patient continues to require a very slow progression of therapeutic exercises primarily limited to standing LE strengthening. Lumbar ROM continues to remain very guarded, primarily by apprehension and a fear of increased pain. CURRENT STATUS  MRI and x-ray revealed significant pathology that her orthopedist, Dr. Brooklyn Krishnamurthy, has deemed an L3-L5 to be necessary. The patient today spoke at length about her apprension to the [facility] and would like to exhaust all other options first. She has had two prior ALFREDO's without improvement, so her current plan is to enquire about other pharmacological treatments (I.e. Gabapentin) and holistic care, inculding chriorpractor and acupuncture. Short Term Goals:  To be accomplished in 4 weeks:     Patient will be able to walk two blocks with <5/10 low back and L LE

## 2023-05-30 ENCOUNTER — HOSPITAL ENCOUNTER (OUTPATIENT)
Facility: HOSPITAL | Age: 53
Setting detail: RECURRING SERIES
Discharge: HOME | End: 2023-06-02
Payer: COMMERCIAL

## 2023-05-30 PROCEDURE — 97110 THERAPEUTIC EXERCISES: CPT

## 2023-05-30 NOTE — PROGRESS NOTES
after manual     min []  Ultrasound,         settings/location:     15 min  unbilled [x]  Ice     []  Heat            location/position:seated, low back         min []  Vasopneumatic Device,      press/temp:   pre-treatment girth :    post-treatment girth :    measured at (landmark       location) : If using vaso (only need to measure limb vaso being performed on)        min []  Other:        Skin assessment post-treatment (if applicable):    [x]  intact    []  redness- no adverse reaction                 []redness - adverse reaction:          [x]  Patient Education billed concurrently with other procedures   [x] Review HEP    [] Progressed/Changed HEP, detail:    [] Other detail:         Other Objective/Functional Measures    Pain Level at end of session (0-10 scale): 0      Assessment   No increase in pain reported with today's exercise, but no significant progress made. Patient will continue to benefit from skilled PT / OT services to modify and progress therapeutic interventions, analyze and address functional mobility deficits, analyze and address ROM deficits, analyze and address strength deficits, analyze and address soft tissue restrictions, analyze and cue for proper movement patterns, and analyze and modify for postural abnormalities to address functional deficits and attain remaining goals. Progress toward goals / Updated goals:  [x]  See Progress Note/Recertification  No significant change compared to last week.     PLAN  Yes  Continue plan of care  Re-Cert Due: n/a  [x]  Upgrade activities as tolerated  []  Discharge due to :  [x]  Other: Patient will review with Dr. Polo Pederson the plan for surgery and may cancel next week's visit      Chris Dunn, PT       5/30/2023       8:53 AM

## 2023-06-05 ENCOUNTER — HOSPITAL ENCOUNTER (OUTPATIENT)
Facility: HOSPITAL | Age: 53
Setting detail: RECURRING SERIES
Discharge: HOME | End: 2023-06-08
Payer: COMMERCIAL

## 2023-06-05 PROCEDURE — 97110 THERAPEUTIC EXERCISES: CPT

## 2023-06-05 NOTE — PROGRESS NOTES
insruct            min []  Mechanical Traction,        type/lbs:        []  pro      []  sup           []  int       []  cont            []  before manual           []  after manual     min []  Ultrasound,         settings/location:     15 min  unbilled [x]  Ice     []  Heat            location/position:seated, low back         min []  Vasopneumatic Device,      press/temp:   pre-treatment girth :    post-treatment girth :    measured at (landmark       location) : If using vaso (only need to measure limb vaso being performed on)        min []  Other:        Skin assessment post-treatment (if applicable):    [x]  intact    []  redness- no adverse reaction                 []redness - adverse reaction:          [x]  Patient Education billed concurrently with other procedures   [x] Review HEP    [] Progressed/Changed HEP, detail:    [] Other detail:         Other Objective/Functional Measures    Pain Level at end of session (0-10 scale): 0      Assessment  Patient will continue to benefit from skilled PT / OT services to modify and progress therapeutic interventions, analyze and address functional mobility deficits, analyze and address ROM deficits, analyze and address strength deficits, analyze and address soft tissue restrictions, analyze and cue for proper movement patterns, and analyze and modify for postural abnormalities to address functional deficits and attain remaining goals. Progress toward goals / Updated goals:  [x]  See Progress Note/Recertification  Continued slow overall progress with therapy, however all exercises were tolerated well. Today we spent considerable time reviewing the laminectomy procedure and may add more challenging exercises next visit.     PLAN  Yes  Continue plan of care  Re-Cert Due: n/a  [x]  Upgrade activities as tolerated  []  Discharge due to :  []  Other:       Andreas Shannon, PT       6/5/2023       1:17 PM

## 2023-06-20 ENCOUNTER — APPOINTMENT (OUTPATIENT)
Facility: HOSPITAL | Age: 53
End: 2023-06-20
Payer: COMMERCIAL

## 2024-03-21 ENCOUNTER — PATIENT MESSAGE (OUTPATIENT)
Facility: CLINIC | Age: 54
End: 2024-03-21

## 2024-03-21 RX ORDER — DOXYCYCLINE HYCLATE 100 MG
100 TABLET ORAL 2 TIMES DAILY
Qty: 14 TABLET | Refills: 0 | OUTPATIENT
Start: 2024-03-21 | End: 2024-03-28

## 2024-03-21 NOTE — TELEPHONE ENCOUNTER
From: Haylie Zuniga  To: Dr. Alhaji Woods  Sent: 3/21/2024 9:40 AM EDT  Subject: Stye    Hello Dr. Woods,  I pray all is well with you today. I’m sending this message because I have a stye on my right eye in which I noticed on Tuesday evening. It has been itching lately, but I have been putting warm compresses on it which decrease the itching sensation and I notice a decrease in the redness this morning.I have no vision changes, I can still see. I was wondering if you could send a prescription into the [facility] pharmacy on [facility].

## 2024-03-22 RX ORDER — GENTAMICIN SULFATE 3 MG/ML
1 SOLUTION/ DROPS OPHTHALMIC EVERY 4 HOURS
Qty: 5 ML | Refills: 0 | Status: SHIPPED | OUTPATIENT
Start: 2024-03-22 | End: 2024-04-01

## 2024-03-26 ENCOUNTER — CLINICAL DOCUMENTATION (OUTPATIENT)
Facility: CLINIC | Age: 54
End: 2024-03-26

## 2024-11-08 ENCOUNTER — OFFICE VISIT (OUTPATIENT)
Age: 54
End: 2024-11-08
Payer: COMMERCIAL

## 2024-11-08 ENCOUNTER — LAB (OUTPATIENT)
Age: 54
End: 2024-11-08

## 2024-11-08 VITALS
WEIGHT: 263 LBS | RESPIRATION RATE: 16 BRPM | OXYGEN SATURATION: 99 % | TEMPERATURE: 97 F | SYSTOLIC BLOOD PRESSURE: 130 MMHG | HEIGHT: 66 IN | BODY MASS INDEX: 42.27 KG/M2 | DIASTOLIC BLOOD PRESSURE: 72 MMHG | HEART RATE: 85 BPM

## 2024-11-08 DIAGNOSIS — R00.2 PALPITATIONS: ICD-10-CM

## 2024-11-08 DIAGNOSIS — Z11.4 SCREENING FOR HIV (HUMAN IMMUNODEFICIENCY VIRUS): ICD-10-CM

## 2024-11-08 DIAGNOSIS — E66.813 CLASS 3 SEVERE OBESITY DUE TO EXCESS CALORIES WITHOUT SERIOUS COMORBIDITY WITH BODY MASS INDEX (BMI) OF 40.0 TO 44.9 IN ADULT: ICD-10-CM

## 2024-11-08 DIAGNOSIS — Z12.11 SCREENING FOR MALIGNANT NEOPLASM OF COLON: ICD-10-CM

## 2024-11-08 DIAGNOSIS — E66.01 CLASS 3 SEVERE OBESITY DUE TO EXCESS CALORIES WITHOUT SERIOUS COMORBIDITY WITH BODY MASS INDEX (BMI) OF 40.0 TO 44.9 IN ADULT: ICD-10-CM

## 2024-11-08 DIAGNOSIS — R00.2 PALPITATIONS: Primary | ICD-10-CM

## 2024-11-08 DIAGNOSIS — M54.16 LUMBAR RADICULITIS: ICD-10-CM

## 2024-11-08 DIAGNOSIS — Z12.31 ENCOUNTER FOR SCREENING MAMMOGRAM FOR MALIGNANT NEOPLASM OF BREAST: ICD-10-CM

## 2024-11-08 DIAGNOSIS — I10 ESSENTIAL HYPERTENSION: ICD-10-CM

## 2024-11-08 DIAGNOSIS — Z23 ENCOUNTER FOR IMMUNIZATION: ICD-10-CM

## 2024-11-08 DIAGNOSIS — R73.02 IMPAIRED GLUCOSE TOLERANCE: ICD-10-CM

## 2024-11-08 DIAGNOSIS — Z12.4 SCREENING FOR MALIGNANT NEOPLASM OF CERVIX: ICD-10-CM

## 2024-11-08 PROCEDURE — 93000 ELECTROCARDIOGRAM COMPLETE: CPT | Performed by: FAMILY MEDICINE

## 2024-11-08 PROCEDURE — 3075F SYST BP GE 130 - 139MM HG: CPT | Performed by: FAMILY MEDICINE

## 2024-11-08 PROCEDURE — 99204 OFFICE O/P NEW MOD 45 MIN: CPT | Performed by: FAMILY MEDICINE

## 2024-11-08 PROCEDURE — 3078F DIAST BP <80 MM HG: CPT | Performed by: FAMILY MEDICINE

## 2024-11-08 PROCEDURE — 90715 TDAP VACCINE 7 YRS/> IM: CPT | Performed by: FAMILY MEDICINE

## 2024-11-08 PROCEDURE — 90471 IMMUNIZATION ADMIN: CPT | Performed by: FAMILY MEDICINE

## 2024-11-08 RX ORDER — GABAPENTIN 300 MG/1
CAPSULE ORAL
COMMUNITY
End: 2024-11-08

## 2024-11-08 RX ORDER — PREGABALIN 75 MG/1
1 CAPSULE ORAL 3 TIMES DAILY
COMMUNITY
Start: 2023-12-07

## 2024-11-08 RX ORDER — METOPROLOL SUCCINATE 25 MG/1
25 TABLET, EXTENDED RELEASE ORAL DAILY
Qty: 30 TABLET | Refills: 2 | Status: SHIPPED | OUTPATIENT
Start: 2024-11-08

## 2024-11-08 SDOH — ECONOMIC STABILITY: FOOD INSECURITY: WITHIN THE PAST 12 MONTHS, YOU WORRIED THAT YOUR FOOD WOULD RUN OUT BEFORE YOU GOT MONEY TO BUY MORE.: NEVER TRUE

## 2024-11-08 SDOH — ECONOMIC STABILITY: INCOME INSECURITY: HOW HARD IS IT FOR YOU TO PAY FOR THE VERY BASICS LIKE FOOD, HOUSING, MEDICAL CARE, AND HEATING?: NOT HARD AT ALL

## 2024-11-08 SDOH — ECONOMIC STABILITY: FOOD INSECURITY: WITHIN THE PAST 12 MONTHS, THE FOOD YOU BOUGHT JUST DIDN'T LAST AND YOU DIDN'T HAVE MONEY TO GET MORE.: NEVER TRUE

## 2024-11-08 ASSESSMENT — PATIENT HEALTH QUESTIONNAIRE - PHQ9
7. TROUBLE CONCENTRATING ON THINGS, SUCH AS READING THE NEWSPAPER OR WATCHING TELEVISION: NOT AT ALL
SUM OF ALL RESPONSES TO PHQ QUESTIONS 1-9: 2
5. POOR APPETITE OR OVEREATING: SEVERAL DAYS
SUM OF ALL RESPONSES TO PHQ QUESTIONS 1-9: 2
3. TROUBLE FALLING OR STAYING ASLEEP: NOT AT ALL
SUM OF ALL RESPONSES TO PHQ9 QUESTIONS 1 & 2: 0
SUM OF ALL RESPONSES TO PHQ QUESTIONS 1-9: 2
10. IF YOU CHECKED OFF ANY PROBLEMS, HOW DIFFICULT HAVE THESE PROBLEMS MADE IT FOR YOU TO DO YOUR WORK, TAKE CARE OF THINGS AT HOME, OR GET ALONG WITH OTHER PEOPLE: NOT DIFFICULT AT ALL
4. FEELING TIRED OR HAVING LITTLE ENERGY: SEVERAL DAYS
1. LITTLE INTEREST OR PLEASURE IN DOING THINGS: NOT AT ALL
2. FEELING DOWN, DEPRESSED OR HOPELESS: NOT AT ALL
8. MOVING OR SPEAKING SO SLOWLY THAT OTHER PEOPLE COULD HAVE NOTICED. OR THE OPPOSITE, BEING SO FIGETY OR RESTLESS THAT YOU HAVE BEEN MOVING AROUND A LOT MORE THAN USUAL: NOT AT ALL
9. THOUGHTS THAT YOU WOULD BE BETTER OFF DEAD, OR OF HURTING YOURSELF: NOT AT ALL
SUM OF ALL RESPONSES TO PHQ QUESTIONS 1-9: 2
6. FEELING BAD ABOUT YOURSELF - OR THAT YOU ARE A FAILURE OR HAVE LET YOURSELF OR YOUR FAMILY DOWN: NOT AT ALL

## 2024-11-08 NOTE — PROGRESS NOTES
[facility] [address] [place] [address]  Phone: [phone]  Fax: [phone]        Chief Complaint   Patient presents with    New Patient    Establish Care     She has been taking estrogen cream that she got off [employer] and would like to discuss this.     Annual Exam     She would like labs. She would like mammogram order and referral to a woman GYN.     Anxiety     She has been anxious since her back surgery.      She is a 54 y.o. female who presents for establish care.  New to [facility].    She is complaining of palpitations today.  Not sure if she is anxious.  Has had increased anxiety since she had a back injury in 7/2023.  Says that these come and go.  Worse if she is stressed.  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  She was prescribed something for this, but never took it.  Does not remember what she was prescribed.  Does not like the idea of being on medication.  She is looking into an online therapy options.  There is no chest pain or shortness of breath.    BP is up today.  Has been up every since she had her back injury.    Says that she fell in 7/2023.  Stepped into a crate, lost balance and fell.  Has had back pain since this.  She has followed with orthopedics for this.  Takes lyrica for chronic back pain.    She has been using a cream labeled \"Estrogen\" that she bought from [employer].  Not sure what is in it.  Asking if this is OK to use.    Prior to Visit Medications    Medication Sig Taking? Authorizing Provider   pregabalin (LYRICA) 75 MG capsule Take 1 capsule by mouth 3 times daily. Yes ProviderAmrit MD   tiZANidine (ZANAFLEX) 4 MG tablet TAKE 1 TABLET BY MOUTH EVERY 8 HOURS AS NEEDED FOR MUSCLE SPASM Yes ProviderAmrit MD   metoprolol succinate (TOPROL XL) 25 MG extended release tablet Take 1 tablet by mouth daily Yes Ulysses Herrera MD   Biotin 2.5 MG TABS Take by mouth daily Yes Automatic Reconciliation, Ar   cetirizine (ZYRTEC) 10 MG tablet

## 2024-11-08 NOTE — PROGRESS NOTES
Identified pt with two pt identifiers(name and )    Chief Complaint   Patient presents with    New Patient    Establish Care     She has been taking estrogen cream that she got off [employer] and would like to discuss this.     Annual Exam     She would like labs. She would like mammogram order and referral to a woman GYN.     Anxiety     She has been anxious since her back surgery.         Health Maintenance Due   Topic    HIV screen     Hepatitis B vaccine (1 of 3 - 19+ 3-dose series)    Breast cancer screen     Colorectal Cancer Screen     Shingles vaccine (1 of 2)    Cervical cancer screen     Depression Monitoring     DTaP/Tdap/Td vaccine (2 - Td or Tdap)    Flu vaccine (1)    COVID-19 Vaccine ( season)       Wt Readings from Last 3 Encounters:   24 119.3 kg (263 lb)   23 122 kg (269 lb)     Temp Readings from Last 3 Encounters:   24 97 °F (36.1 °C) (Temporal)     BP Readings from Last 3 Encounters:   24 (!) 168/84   23 (!) 142/85     Pulse Readings from Last 3 Encounters:   24 85   23 99           Depression Screening:  :         2024     9:28 AM 2023     2:35 PM   PHQ-9 Questionaire   Little interest or pleasure in doing things 0 0   Feeling down, depressed, or hopeless 0 0   Trouble falling or staying asleep, or sleeping too much 0    Feeling tired or having little energy 1    Poor appetite or overeating 1    Feeling bad about yourself - or that you are a failure or have let yourself or your family down 0    Trouble concentrating on things, such as reading the newspaper or watching television 0    Moving or speaking so slowly that other people could have noticed. Or the opposite - being so fidgety or restless that you have been moving around a lot more than usual 0    Thoughts that you would be better off dead, or of hurting yourself in some way 0    PHQ-9 Total Score 2 0   If you checked off any problems, how difficult have these problems made it

## 2024-11-09 LAB
ALBUMIN SERPL-MCNC: 4.5 G/DL (ref 3.8–4.9)
ALP SERPL-CCNC: 89 IU/L (ref 44–121)
ALT SERPL-CCNC: 20 IU/L (ref 0–32)
AST SERPL-CCNC: 31 IU/L (ref 0–40)
BASOPHILS # BLD AUTO: 0.1 X10E3/UL (ref 0–0.2)
BASOPHILS NFR BLD AUTO: 1 %
BILIRUB SERPL-MCNC: 0.4 MG/DL (ref 0–1.2)
BUN SERPL-MCNC: 8 MG/DL (ref 6–24)
BUN/CREAT SERPL: 8 (ref 9–23)
CALCIUM SERPL-MCNC: 10.8 MG/DL (ref 8.7–10.2)
CHLORIDE SERPL-SCNC: 103 MMOL/L (ref 96–106)
CHOLEST SERPL-MCNC: 189 MG/DL (ref 100–199)
CO2 SERPL-SCNC: 24 MMOL/L (ref 20–29)
CREAT SERPL-MCNC: 1 MG/DL (ref 0.57–1)
EGFRCR SERPLBLD CKD-EPI 2021: 67 ML/MIN/1.73
EOSINOPHIL # BLD AUTO: 0.2 X10E3/UL (ref 0–0.4)
EOSINOPHIL NFR BLD AUTO: 3 %
ERYTHROCYTE [DISTWIDTH] IN BLOOD BY AUTOMATED COUNT: 13.8 % (ref 11.7–15.4)
GLOBULIN SER CALC-MCNC: 3.1 G/DL (ref 1.5–4.5)
GLUCOSE SERPL-MCNC: 91 MG/DL (ref 70–99)
HBA1C MFR BLD: 5.9 % (ref 4.8–5.6)
HCT VFR BLD AUTO: 45.4 % (ref 34–46.6)
HDLC SERPL-MCNC: 73 MG/DL
HGB BLD-MCNC: 14.4 G/DL (ref 11.1–15.9)
HIV 1+2 AB+HIV1 P24 AG SERPL QL IA: NON REACTIVE
IMM GRANULOCYTES # BLD AUTO: 0 X10E3/UL (ref 0–0.1)
IMM GRANULOCYTES NFR BLD AUTO: 0 %
IMP & REVIEW OF LAB RESULTS: NORMAL
LDLC SERPL CALC-MCNC: 98 MG/DL (ref 0–99)
LYMPHOCYTES # BLD AUTO: 1.6 X10E3/UL (ref 0.7–3.1)
LYMPHOCYTES NFR BLD AUTO: 27 %
MCH RBC QN AUTO: 27.9 PG (ref 26.6–33)
MCHC RBC AUTO-ENTMCNC: 31.7 G/DL (ref 31.5–35.7)
MCV RBC AUTO: 88 FL (ref 79–97)
MONOCYTES # BLD AUTO: 0.6 X10E3/UL (ref 0.1–0.9)
MONOCYTES NFR BLD AUTO: 9 %
NEUTROPHILS # BLD AUTO: 3.4 X10E3/UL (ref 1.4–7)
NEUTROPHILS NFR BLD AUTO: 60 %
PLATELET # BLD AUTO: 603 X10E3/UL (ref 150–450)
POTASSIUM SERPL-SCNC: 5 MMOL/L (ref 3.5–5.2)
PROT SERPL-MCNC: 7.6 G/DL (ref 6–8.5)
RBC # BLD AUTO: 5.16 X10E6/UL (ref 3.77–5.28)
SODIUM SERPL-SCNC: 144 MMOL/L (ref 134–144)
TRIGL SERPL-MCNC: 100 MG/DL (ref 0–149)
TSH SERPL DL<=0.005 MIU/L-ACNC: 1.21 UIU/ML (ref 0.45–4.5)
VLDLC SERPL CALC-MCNC: 18 MG/DL (ref 5–40)
WBC # BLD AUTO: 5.9 X10E3/UL (ref 3.4–10.8)

## 2024-11-12 ENCOUNTER — TELEPHONE (OUTPATIENT)
Age: 54
End: 2024-11-12

## 2024-11-12 NOTE — TELEPHONE ENCOUNTER
Pt called and said she just received a call from Dr. Herrera but she missed it. Pt said you can leave a message on the voicemail.

## 2025-01-15 ENCOUNTER — TELEMEDICINE (OUTPATIENT)
Age: 55
End: 2025-01-15
Payer: COMMERCIAL

## 2025-01-15 DIAGNOSIS — J06.9 UPPER RESPIRATORY TRACT INFECTION, UNSPECIFIED TYPE: Primary | ICD-10-CM

## 2025-01-15 DIAGNOSIS — I10 ESSENTIAL HYPERTENSION: ICD-10-CM

## 2025-01-15 PROCEDURE — 99214 OFFICE O/P EST MOD 30 MIN: CPT | Performed by: FAMILY MEDICINE

## 2025-01-15 RX ORDER — AZITHROMYCIN 250 MG/1
TABLET, FILM COATED ORAL
Qty: 6 TABLET | Refills: 0 | Status: SHIPPED | OUTPATIENT
Start: 2025-01-15 | End: 2025-01-25

## 2025-01-15 RX ORDER — FLUTICASONE PROPIONATE 50 MCG
1 SPRAY, SUSPENSION (ML) NASAL DAILY
Qty: 1 EACH | Refills: 0 | Status: SHIPPED | OUTPATIENT
Start: 2025-01-15

## 2025-01-15 SDOH — ECONOMIC STABILITY: FOOD INSECURITY: WITHIN THE PAST 12 MONTHS, YOU WORRIED THAT YOUR FOOD WOULD RUN OUT BEFORE YOU GOT MONEY TO BUY MORE.: NEVER TRUE

## 2025-01-15 SDOH — ECONOMIC STABILITY: FOOD INSECURITY: WITHIN THE PAST 12 MONTHS, THE FOOD YOU BOUGHT JUST DIDN'T LAST AND YOU DIDN'T HAVE MONEY TO GET MORE.: NEVER TRUE

## 2025-01-15 ASSESSMENT — PATIENT HEALTH QUESTIONNAIRE - PHQ9
SUM OF ALL RESPONSES TO PHQ9 QUESTIONS 1 & 2: 0
6. FEELING BAD ABOUT YOURSELF - OR THAT YOU ARE A FAILURE OR HAVE LET YOURSELF OR YOUR FAMILY DOWN: NOT AT ALL
7. TROUBLE CONCENTRATING ON THINGS, SUCH AS READING THE NEWSPAPER OR WATCHING TELEVISION: NOT AT ALL
SUM OF ALL RESPONSES TO PHQ QUESTIONS 1-9: 0
10. IF YOU CHECKED OFF ANY PROBLEMS, HOW DIFFICULT HAVE THESE PROBLEMS MADE IT FOR YOU TO DO YOUR WORK, TAKE CARE OF THINGS AT HOME, OR GET ALONG WITH OTHER PEOPLE: NOT DIFFICULT AT ALL
2. FEELING DOWN, DEPRESSED OR HOPELESS: NOT AT ALL
3. TROUBLE FALLING OR STAYING ASLEEP: NOT AT ALL
SUM OF ALL RESPONSES TO PHQ QUESTIONS 1-9: 0
SUM OF ALL RESPONSES TO PHQ QUESTIONS 1-9: 0
4. FEELING TIRED OR HAVING LITTLE ENERGY: NOT AT ALL
SUM OF ALL RESPONSES TO PHQ QUESTIONS 1-9: 0
9. THOUGHTS THAT YOU WOULD BE BETTER OFF DEAD, OR OF HURTING YOURSELF: NOT AT ALL
8. MOVING OR SPEAKING SO SLOWLY THAT OTHER PEOPLE COULD HAVE NOTICED. OR THE OPPOSITE, BEING SO FIGETY OR RESTLESS THAT YOU HAVE BEEN MOVING AROUND A LOT MORE THAN USUAL: NOT AT ALL
1. LITTLE INTEREST OR PLEASURE IN DOING THINGS: NOT AT ALL
5. POOR APPETITE OR OVEREATING: NOT AT ALL

## 2025-01-15 NOTE — PROGRESS NOTES
[facility]  [address] [place] [address]  Phone: [phone]  Fax: [phone]      Haylie Zuniga, was evaluated through a synchronous (real-time) audio-video encounter. The patient (or guardian if applicable) is aware that this is a billable service, which includes applicable co-pays. This Virtual Visit was conducted with patient's (and/or legal guardian's) consent. Patient identification was verified, and a caregiver was present when appropriate.   The patient was located at Home: [address]  [facility] [address]  Provider was located at Home (Appt Dept State): [place]  Confirm you are appropriately licensed, registered, or certified to deliver care in the state where the patient is located as indicated above. If you are not or unsure, please re-schedule the visit: Yes, I confirm.       Chief Complaint   Patient presents with    Cough    Pharyngitis     She is a 54 y.o. female who presents for acute virtual visit.  Says that she has been sick for ~1 week.  She is complaining of increased stuffy/runny nose, body aches, cough, hoarseness, sore throat.  She has been taking zicam, vitamin C, OTC cough and cold medications--these are helping.  She had subjective fever--night sweats over the past 3 days.  She has not done a COVID-19 testing.    I also note that she did not return for a BP check that we wanted to do in early December 2024.  She tells me that her blood pressures at home continue to be elevated despite the modifications that we made at her last visit.    Reviewed PmHx, RxHx, FmHx, SocHx, AllgHx and updated and dated in the chart.      Objective:   There were no vitals filed for this visit.  Physical Examination:    Physical Exam  Nursing note reviewed.   Constitutional:       General: She is not in acute distress.     Appearance: Normal appearance.   HENT:      Head: Normocephalic.   Pulmonary:      Effort: Pulmonary effort is normal. No respiratory distress.

## 2025-01-15 NOTE — PROGRESS NOTES
Identified pt with two pt identifiers(name and )    Chief Complaint   Patient presents with    Cough    Pharyngitis        Health Maintenance Due   Topic    Hepatitis B vaccine (1 of 3 - 19+ 3-dose series)    Breast cancer screen     Colorectal Cancer Screen     Shingles vaccine (1 of 2)    Cervical cancer screen     COVID-19 Vaccine ( season)       Wt Readings from Last 3 Encounters:   24 119.3 kg (263 lb)   23 122 kg (269 lb)     Temp Readings from Last 3 Encounters:   24 97 °F (36.1 °C) (Temporal)     BP Readings from Last 3 Encounters:   24 130/72   23 (!) 142/85     Pulse Readings from Last 3 Encounters:   24 85   23 99           Depression Screening:  :         1/15/2025     8:25 AM 2024     9:28 AM 2023     2:35 PM   PHQ-9 Questionaire   Little interest or pleasure in doing things 0 0 0   Feeling down, depressed, or hopeless 0 0 0   Trouble falling or staying asleep, or sleeping too much 0 0    Feeling tired or having little energy  1    Poor appetite or overeating 0 1    Feeling bad about yourself - or that you are a failure or have let yourself or your family down 0 0    Trouble concentrating on things, such as reading the newspaper or watching television 0 0    Moving or speaking so slowly that other people could have noticed. Or the opposite - being so fidgety or restless that you have been moving around a lot more than usual 0 0    Thoughts that you would be better off dead, or of hurting yourself in some way 0 0    PHQ-9 Total Score 0 2 0   If you checked off any problems, how difficult have these problems made it for you to do your work, take care of things at home, or get along with other people? 0 0         Fall Risk Assessment:  :          No data to display                 Abuse Screening:  :          No data to display                 Coordination of Care Questionnaire:  :     \"Have you been to the ER, urgent care clinic since your

## 2025-01-21 ENCOUNTER — COMMUNITY OUTREACH (OUTPATIENT)
Age: 55
End: 2025-01-21

## 2025-01-28 DIAGNOSIS — I10 ESSENTIAL HYPERTENSION: ICD-10-CM

## 2025-01-28 RX ORDER — METOPROLOL SUCCINATE 25 MG/1
25 TABLET, EXTENDED RELEASE ORAL DAILY
Qty: 30 TABLET | Refills: 0 | OUTPATIENT
Start: 2025-01-28

## 2025-02-18 DIAGNOSIS — I10 ESSENTIAL HYPERTENSION: ICD-10-CM

## 2025-02-18 RX ORDER — METOPROLOL SUCCINATE 25 MG/1
25 TABLET, EXTENDED RELEASE ORAL DAILY
Qty: 90 TABLET | Refills: 1 | Status: SHIPPED | OUTPATIENT
Start: 2025-02-18

## 2025-02-18 NOTE — TELEPHONE ENCOUNTER
metoprolol succinate (TOPROL XL) 25 MG extended release tablet       [facility] [address] [place] [address] [address] - P [phone] - F [phone]     Pt has appt for 3/7 and wants to know if she could please get a refill as she is completley out

## 2025-03-07 ENCOUNTER — OFFICE VISIT (OUTPATIENT)
Age: 55
End: 2025-03-07
Payer: COMMERCIAL

## 2025-03-07 VITALS
TEMPERATURE: 98 F | OXYGEN SATURATION: 99 % | RESPIRATION RATE: 16 BRPM | HEIGHT: 66 IN | HEART RATE: 77 BPM | BODY MASS INDEX: 43.71 KG/M2 | SYSTOLIC BLOOD PRESSURE: 136 MMHG | WEIGHT: 272 LBS | DIASTOLIC BLOOD PRESSURE: 84 MMHG

## 2025-03-07 DIAGNOSIS — Z91.199 NONCOMPLIANCE: ICD-10-CM

## 2025-03-07 DIAGNOSIS — E66.01 CLASS 3 SEVERE OBESITY DUE TO EXCESS CALORIES WITHOUT SERIOUS COMORBIDITY WITH BODY MASS INDEX (BMI) OF 40.0 TO 44.9 IN ADULT: ICD-10-CM

## 2025-03-07 DIAGNOSIS — I10 ESSENTIAL HYPERTENSION: Primary | ICD-10-CM

## 2025-03-07 DIAGNOSIS — M54.16 LUMBAR RADICULITIS: ICD-10-CM

## 2025-03-07 DIAGNOSIS — E66.813 CLASS 3 SEVERE OBESITY DUE TO EXCESS CALORIES WITHOUT SERIOUS COMORBIDITY WITH BODY MASS INDEX (BMI) OF 40.0 TO 44.9 IN ADULT: ICD-10-CM

## 2025-03-07 PROCEDURE — 3075F SYST BP GE 130 - 139MM HG: CPT | Performed by: FAMILY MEDICINE

## 2025-03-07 PROCEDURE — 3079F DIAST BP 80-89 MM HG: CPT | Performed by: FAMILY MEDICINE

## 2025-03-07 PROCEDURE — 99213 OFFICE O/P EST LOW 20 MIN: CPT | Performed by: FAMILY MEDICINE

## 2025-03-07 RX ORDER — NAPROXEN SODIUM 550 MG/1
TABLET ORAL
COMMUNITY
Start: 2025-03-05

## 2025-03-07 ASSESSMENT — PATIENT HEALTH QUESTIONNAIRE - PHQ9
9. THOUGHTS THAT YOU WOULD BE BETTER OFF DEAD, OR OF HURTING YOURSELF: NOT AT ALL
6. FEELING BAD ABOUT YOURSELF - OR THAT YOU ARE A FAILURE OR HAVE LET YOURSELF OR YOUR FAMILY DOWN: NOT AT ALL
SUM OF ALL RESPONSES TO PHQ QUESTIONS 1-9: 0
7. TROUBLE CONCENTRATING ON THINGS, SUCH AS READING THE NEWSPAPER OR WATCHING TELEVISION: NOT AT ALL
SUM OF ALL RESPONSES TO PHQ QUESTIONS 1-9: 0
4. FEELING TIRED OR HAVING LITTLE ENERGY: NOT AT ALL
2. FEELING DOWN, DEPRESSED OR HOPELESS: NOT AT ALL
8. MOVING OR SPEAKING SO SLOWLY THAT OTHER PEOPLE COULD HAVE NOTICED. OR THE OPPOSITE, BEING SO FIGETY OR RESTLESS THAT YOU HAVE BEEN MOVING AROUND A LOT MORE THAN USUAL: NOT AT ALL
3. TROUBLE FALLING OR STAYING ASLEEP: NOT AT ALL
1. LITTLE INTEREST OR PLEASURE IN DOING THINGS: NOT AT ALL
SUM OF ALL RESPONSES TO PHQ QUESTIONS 1-9: 0
SUM OF ALL RESPONSES TO PHQ QUESTIONS 1-9: 0
10. IF YOU CHECKED OFF ANY PROBLEMS, HOW DIFFICULT HAVE THESE PROBLEMS MADE IT FOR YOU TO DO YOUR WORK, TAKE CARE OF THINGS AT HOME, OR GET ALONG WITH OTHER PEOPLE: NOT DIFFICULT AT ALL
5. POOR APPETITE OR OVEREATING: NOT AT ALL

## 2025-03-07 NOTE — PROGRESS NOTES
No data to display                 Abuse Screening:  :          No data to display                 Coordination of Care Questionnaire:  :     \"Have you been to the ER, urgent care clinic since your last visit?  Hospitalized since your last visit?\"    NO    “Have you seen or consulted any other health care providers outside our system since your last visit?”    NO    Have you had a mammogram?”   YES [facility] to request most recent records if not in the chart    No breast cancer screening on file      “Have you had a pap smear?”    YES - Where: 2 weeks ago Nurse/CMA to request most recent records if not in the chart    Date of last Cervical Cancer screen (HPV or PAP): 3/5/2018       “Have you had a colorectal cancer screening such as a colonoscopy/FIT/Cologuard?    NO    No colonoscopy on file  No cologuard on file  No FIT/FOBT on file   No flexible sigmoidoscopy on file       Click Here for Release of Records Request    
regimen. Encouraging her to take this daily.  She will work on a better routine. Will see her back in 6 months.    Return in about 6 months (around 9/7/2025) for HTN + fasting labs.     I have discussed the diagnosis with the patient and the intended treatment plan as seen in the above orders. The patient has received an after-visit summary and questions were answered concerning future plans. Asked to return should symptoms worsen or not improve with treatment. Any pending labs and studies will be relayed to patient when they become available.     Pt verbalizes understanding of plan of care and denies further questions or concerns at this time.     Ulysses Herrera MD, FAAFP

## 2025-03-20 ENCOUNTER — OFFICE VISIT (OUTPATIENT)
Age: 55
End: 2025-03-20
Payer: COMMERCIAL

## 2025-03-20 VITALS
BODY MASS INDEX: 42.75 KG/M2 | OXYGEN SATURATION: 96 % | DIASTOLIC BLOOD PRESSURE: 82 MMHG | WEIGHT: 266 LBS | HEIGHT: 66 IN | SYSTOLIC BLOOD PRESSURE: 160 MMHG | HEART RATE: 89 BPM | RESPIRATION RATE: 16 BRPM

## 2025-03-20 DIAGNOSIS — I10 PRIMARY HYPERTENSION: Primary | ICD-10-CM

## 2025-03-20 DIAGNOSIS — R00.2 PALPITATIONS: ICD-10-CM

## 2025-03-20 PROCEDURE — 3079F DIAST BP 80-89 MM HG: CPT | Performed by: SPECIALIST

## 2025-03-20 PROCEDURE — 3077F SYST BP >= 140 MM HG: CPT | Performed by: SPECIALIST

## 2025-03-20 PROCEDURE — 99204 OFFICE O/P NEW MOD 45 MIN: CPT | Performed by: SPECIALIST

## 2025-03-20 PROCEDURE — 93000 ELECTROCARDIOGRAM COMPLETE: CPT | Performed by: SPECIALIST

## 2025-03-20 RX ORDER — NEBIVOLOL 10 MG/1
10 TABLET ORAL DAILY
Qty: 90 TABLET | Refills: 3 | Status: SHIPPED | OUTPATIENT
Start: 2025-03-20

## 2025-03-20 NOTE — PROGRESS NOTES
Vishal Handy MD. [facility]          Patient: Haylie Zuniga  : 1970      Today's Date: 3/20/2025        HISTORY OF PRESENT ILLNESS:     History of Present Illness:    Referred for HTN.    She feels fine - no CP or SOB or palpitations.          PAST MEDICAL HISTORY:     Past Medical History:   Diagnosis Date    Anxiety and depression     Back pain 2023    fall at work - work com    HTN (hypertension)     Morbid obesity     Preventative health care        Past Surgical History:   Procedure Laterality Date     SECTION               CURRENT MEDICATIONS:    .  Current Outpatient Medications   Medication Sig Dispense Refill    naproxen sodium (ANAPROX) 550 MG tablet       metoprolol succinate (TOPROL XL) 25 MG extended release tablet Take 1 tablet by mouth daily 90 tablet 1    tiZANidine (ZANAFLEX) 4 MG tablet TAKE 1 TABLET BY MOUTH EVERY 8 HOURS AS NEEDED FOR MUSCLE SPASM      Biotin 2.5 MG TABS Take by mouth daily      cetirizine (ZYRTEC) 10 MG tablet Take 1 tablet by mouth daily      vitamin D (CHOLECALCIFEROL) 25 MCG (1000 UT) TABS tablet Take 3 tablets by mouth daily      lidocaine 4 % external patch Apply 1 patch topically daily      fluticasone (FLONASE) 50 MCG/ACT nasal spray 1 spray by Each Nostril route daily (Patient not taking: Reported on 3/20/2025) 1 each 0    pregabalin (LYRICA) 75 MG capsule Take 1 capsule by mouth 3 times daily. (Patient not taking: Reported on 3/20/2025)       No current facility-administered medications for this visit.       No Known Allergies      SOCIAL HISTORY:     Social History     Tobacco Use    Smoking status: Never    Smokeless tobacco: Never   Vaping Use    Vaping status: Never Used   Substance Use Topics    Alcohol use: Yes    Drug use: No         FAMILY HISTORY:     Family History   Problem Relation Age of Onset    Cancer Maternal Aunt         thyroid, lung cancer    Cancer Mother         thyroid    Asthma Brother     Diabetes Brother     Arrhythmia Father

## 2025-03-20 NOTE — PROGRESS NOTES
had concerns including Palpitations.    Vitals:    03/20/25 1358   BP: (!) 160/82   BP Site: Left Upper Arm   Patient Position: Sitting   Pulse: 89   Resp: 16   SpO2: 96%   Weight: 120.7 kg (266 lb)   Height: 1.676 m (5' 6\")        Chest pain No    Refills No        1. Have you been to the ER, urgent care clinic since your last visit? No       Hospitalized since your last visit? No       Where?        Date?

## 2025-03-25 ENCOUNTER — TELEPHONE (OUTPATIENT)
Age: 55
End: 2025-03-25

## 2025-03-25 NOTE — TELEPHONE ENCOUNTER
\"Erma Herrera, this message is for my wife Haylie Zuniga [birth year]. She is going through menopause and is experiencing the hotflashes, some joint issues during the night. She would like to get a prescription for prempro. She was seen two weeks ago at your office.\"    Message from pts

## 2025-04-30 ENCOUNTER — RESULTS FOLLOW-UP (OUTPATIENT)
Age: 55
End: 2025-04-30